# Patient Record
Sex: MALE | Race: BLACK OR AFRICAN AMERICAN | NOT HISPANIC OR LATINO | Employment: UNEMPLOYED | URBAN - METROPOLITAN AREA
[De-identification: names, ages, dates, MRNs, and addresses within clinical notes are randomized per-mention and may not be internally consistent; named-entity substitution may affect disease eponyms.]

---

## 2017-05-03 ENCOUNTER — APPOINTMENT (OUTPATIENT)
Dept: GENERAL RADIOLOGY | Facility: CLINIC | Age: 27
End: 2017-05-03
Attending: EMERGENCY MEDICINE
Payer: MEDICAID

## 2017-05-03 ENCOUNTER — HOSPITAL ENCOUNTER (EMERGENCY)
Facility: CLINIC | Age: 27
Discharge: HOME | End: 2017-05-03
Attending: EMERGENCY MEDICINE | Admitting: EMERGENCY MEDICINE
Payer: MEDICAID

## 2017-05-03 VITALS
SYSTOLIC BLOOD PRESSURE: 116 MMHG | RESPIRATION RATE: 18 BRPM | TEMPERATURE: 98.7 F | BODY MASS INDEX: 17.89 KG/M2 | WEIGHT: 135 LBS | HEIGHT: 73 IN | OXYGEN SATURATION: 100 % | DIASTOLIC BLOOD PRESSURE: 65 MMHG | HEART RATE: 97 BPM

## 2017-05-03 DIAGNOSIS — F10.920 ALCOHOL INTOXICATION, UNCOMPLICATED (H): ICD-10-CM

## 2017-05-03 DIAGNOSIS — R07.89 CHEST WALL PAIN: ICD-10-CM

## 2017-05-03 LAB
ALCOHOL BREATH TEST: 0.13 (ref 0–0.01)
INTERPRETATION ECG - MUSE: NORMAL

## 2017-05-03 PROCEDURE — 93010 ELECTROCARDIOGRAM REPORT: CPT | Mod: Z6 | Performed by: EMERGENCY MEDICINE

## 2017-05-03 PROCEDURE — 82075 ASSAY OF BREATH ETHANOL: CPT

## 2017-05-03 PROCEDURE — 71020 XR CHEST 2 VW: CPT

## 2017-05-03 PROCEDURE — 93005 ELECTROCARDIOGRAM TRACING: CPT

## 2017-05-03 PROCEDURE — 99284 EMERGENCY DEPT VISIT MOD MDM: CPT | Mod: 25 | Performed by: EMERGENCY MEDICINE

## 2017-05-03 PROCEDURE — 99284 EMERGENCY DEPT VISIT MOD MDM: CPT | Mod: 25

## 2017-05-03 ASSESSMENT — ENCOUNTER SYMPTOMS
VOMITING: 0
FEVER: 0
COUGH: 0
LIGHT-HEADEDNESS: 0
CONFUSION: 0
RHINORRHEA: 0
EYE PAIN: 0
PALPITATIONS: 0
BRUISES/BLEEDS EASILY: 0
NECK PAIN: 0
CHILLS: 0
WEAKNESS: 0
SORE THROAT: 0
BACK PAIN: 0
SHORTNESS OF BREATH: 0
DIZZINESS: 0
APPETITE CHANGE: 0
NAUSEA: 0
SINUS PRESSURE: 0
MYALGIAS: 0
FATIGUE: 1
ABDOMINAL PAIN: 0
ARTHRALGIAS: 0
HEADACHES: 0
FLANK PAIN: 0
CHEST TIGHTNESS: 0
DIAPHORESIS: 0
HEMATURIA: 0

## 2017-05-03 NOTE — ED AVS SNAPSHOT
[facility], Emergency Department    [address]    [place] [address]    Phone:  [phone]    Fax:  [phone]                                       Candido Sahni   MRN: [id number]    Department:  [facility], Emergency Department   Date of Visit:  5/3/2017           Patient Information     Date Of Birth          1990        Your diagnoses for this visit were:     Chest wall pain     Alcohol intoxication, uncomplicated (H)        You were seen by Tereso Larson MD.        Discharge Instructions       Take ibuprofen or Tylenol as needed for pain.  Clinic follow up with your primary care provider and with your psychiatrist.  Return if persistent symptoms.   Avoid excessive alcohol use.    24 Hour Appointment Hotline       To make an appointment at any [facility] clinic, call [phone] ([phone]). If you don't have a family doctor or clinic, we will help you find one. [facility] clinics are conveniently located to serve the needs of you and your family.             Review of your medicines      Notice     You have not been prescribed any medications.            Procedures and tests performed during your visit     Alcohol breath test POCT    Chest XR,  PA & LAT    EKG 12-lead      Orders Needing Specimen Collection     None      Pending Results     Date and Time Order Name Status Description    5/3/2017 0157 EKG 12-lead Preliminary             Pending Culture Results     No orders found from 5/1/2017 to 5/4/2017.            Pending Results Instructions     If you had any lab results that were not finalized at the time of your Discharge, you can call the ED Lab Result RN at [phone]. You will be contacted by this team for any positive Lab results or changes in treatment. The nurses are available 7 days a week from 10A to 6:30P.  You can leave a message 24 hours per day and they will return your call.        Thank you for choosing [facility]       Thank you for choosing [facility] for  "your care. Our goal is always to provide you with excellent care. Hearing back from our patients is one way we can continue to improve our services. Please take a few minutes to complete the written survey that you may receive in the mail after you visit with us. Thank you!        [employer] Information     [employer] lets you send messages to your doctor, view your test results, renew your prescriptions, schedule appointments and more. To sign up, go to [email or web address] . Click on \"Log in\" on the left side of the screen, which will take you to the Welcome page. Then click on \"Sign up Now\" on the right side of the page.     You will be asked to enter the access code listed below, as well as some personal information. Please follow the directions to create your username and password.     Your access code is: [email or web address]  Expires: 2017  5:55 AM     Your access code will  in 90 days. If you need help or a new code, please call your [facility] clinic or [phone].        Care EveryWhere ID     This is your Care EveryWhere ID. This could be used by other organizations to access your [facility] medical records  [id number]        After Visit Summary       This is your record. Keep this with you and show to your community pharmacist(s) and doctor(s) at your next visit.                  "

## 2017-05-03 NOTE — DISCHARGE INSTRUCTIONS
Take ibuprofen or Tylenol as needed for pain.  Clinic follow up with your primary care provider and with your psychiatrist.  Return if persistent symptoms.   Avoid excessive alcohol use.

## 2017-05-03 NOTE — ED PROVIDER NOTES
"  History     Chief Complaint   Patient presents with     Chest Wall Pain     feels like straw blowing in my chest, patient admits drinking etoh     HPI  Candido Sahni is a 26 year old male with history of psychosis and homelessness who presents with 2 day history of chest pain. Located bilateral anterior chest. No trauma. No cough or fever. No dyspnea. No history of thromboembolic disease or cardiovascular disease. No history of diabetes, dyslipidemia, or hypertension. Reports alcohol use. Denies cocaine or other drug use. No leg pain or swelling.    I have reviewed the Medications, Allergies, Past Medical and Surgical History, and Social History in the [facility] system.  Past Medical History:   Diagnosis Date     Psychosis 4/6/2012       Review of Systems   Constitutional: Positive for fatigue. Negative for appetite change, chills, diaphoresis and fever.   HENT: Negative for congestion, rhinorrhea, sinus pressure and sore throat.    Eyes: Negative for pain and visual disturbance.   Respiratory: Negative for cough, chest tightness and shortness of breath.    Cardiovascular: Positive for chest pain. Negative for palpitations and leg swelling.   Gastrointestinal: Negative for abdominal pain, nausea and vomiting.   Genitourinary: Negative for flank pain and hematuria.   Musculoskeletal: Negative for arthralgias, back pain, myalgias and neck pain.   Skin: Negative for rash.   Allergic/Immunologic: Negative for immunocompromised state.   Neurological: Negative for dizziness, syncope, weakness, light-headedness and headaches.   Hematological: Does not bruise/bleed easily.   Psychiatric/Behavioral: Negative for confusion and suicidal ideas.       Physical Exam   BP: 118/84  Pulse: 103  Temp: 97.8  F (36.6  C)  Resp: 16  Height: 185.4 cm (6' 1\")  Weight: 61.2 kg (135 lb)  SpO2: 99 %  Physical Exam   Constitutional:   Thin male, awake and alert, no acute distress.    HENT:   Head: Normocephalic and atraumatic.   Mouth/Throat: " Oropharynx is clear and moist.   Eyes: Conjunctivae and EOM are normal.   Neck: Normal range of motion. Neck supple.   Cardiovascular: Normal rate, regular rhythm, normal heart sounds and intact distal pulses.  Exam reveals no gallop and no friction rub.    No murmur heard.  Pulmonary/Chest: Effort normal and breath sounds normal. No respiratory distress. He exhibits tenderness.   Abdominal: Soft. There is no tenderness.   Musculoskeletal: He exhibits no edema or tenderness.   Skin: Skin is warm and dry. No rash noted.   Psychiatric: His affect is blunt. He is withdrawn. He expresses no homicidal and no suicidal ideation. He is noncommunicative.   Nursing note and vitals reviewed.      ED Course     ED Course     Procedures             EKG Interpretation:      Interpreted by Tereso Mayfield  Time reviewed: 0205  Symptoms at time of EKG: chest pain   Rhythm: normal sinus   Rate: normal  Axis: normal  Ectopy: none  Conduction: normal  ST Segments/ T Waves: No ST-T wave changes  Q Waves: none  Comparison to prior: No old EKG available    Clinical Impression: normal EKG          Critical Care time:  none               Labs Ordered and Resulted from Time of ED Arrival Up to the Time of Departure from the ED   ALCOHOL BREATH TEST POCT - Abnormal; Notable for the following:        Result Value    Alcohol Breath Test 0.126 (*)     All other components within normal limits            Assessments & Plan (with Medical Decision Making)   Rest, cold compresses and acetaminophen or ibuprofen for pain. Given list of homeless shelters. Given phone number for chemical dependency services. Instructed to follow up with primary care and psychiatry. At time of discharge he is awake and alert, ambulatory without difficulty, and is tolerating po well.   I have reviewed the nursing notes.    I have reviewed the findings, diagnosis, plan and need for follow up with the patient.    New Prescriptions    No medications on file       Final  diagnoses:   Chest wall pain   Alcohol intoxication, uncomplicated (H)       5/3/2017   [facility], [place], EMERGENCY DEPARTMENT     Tereso Larson MD  05/03/17 7807

## 2017-05-03 NOTE — ED NOTES
Patient is awake and alert. Denies SI and HI. Pleasant and cooperative. Alcohol breath was 0.126.  stated patient does not need to be on a watch.

## 2017-05-03 NOTE — ED AVS SNAPSHOT
[facility], [place], Emergency Department    [address]    [facility] [address]    Phone:  [phone]    Fax:  [phone]                                       Candido Sahni   MRN: [id number]    Department:  [facility], Emergency Department   Date of Visit:  5/3/2017           After Visit Summary Signature Page     I have received my discharge instructions, and my questions have been answered. I have discussed any challenges I see with this plan with the nurse or doctor.    ..........................................................................................................................................  Patient/Patient Representative Signature      ..........................................................................................................................................  Patient Representative Print Name and Relationship to Patient    ..................................................               ................................................  Date                                            Time    ..........................................................................................................................................  Reviewed by Signature/Title    ...................................................              ..............................................  Date                                                            Time

## 2019-10-05 ENCOUNTER — HOSPITAL ENCOUNTER (EMERGENCY)
Facility: CLINIC | Age: 29
Discharge: HOME | End: 2019-10-06
Attending: EMERGENCY MEDICINE | Admitting: EMERGENCY MEDICINE

## 2019-10-05 DIAGNOSIS — F10.920 ALCOHOLIC INTOXICATION WITHOUT COMPLICATION (H): ICD-10-CM

## 2019-10-05 PROCEDURE — 99282 EMERGENCY DEPT VISIT SF MDM: CPT

## 2019-10-05 NOTE — ED AVS SNAPSHOT
Emergency Department  [address] [place] [address]  Phone:  [phone]  Fax:  [phone]                                    Rika Sahni   MRN: [id number]    Department:   Emergency Department   Date of Visit:  10/5/2019           After Visit Summary Signature Page    I have received my discharge instructions, and my questions have been answered. I have discussed any challenges I see with this plan with the nurse or doctor.    ..........................................................................................................................................  Patient/Patient Representative Signature      ..........................................................................................................................................  Patient Representative Print Name and Relationship to Patient    ..................................................               ................................................  Date                                   Time    ..........................................................................................................................................  Reviewed by Signature/Title    ...................................................              ..............................................  Date                                               Time          22EPIC Rev 08/18

## 2019-10-06 VITALS
RESPIRATION RATE: 16 BRPM | DIASTOLIC BLOOD PRESSURE: 82 MMHG | TEMPERATURE: 97.5 F | SYSTOLIC BLOOD PRESSURE: 131 MMHG | OXYGEN SATURATION: 99 %

## 2019-10-06 NOTE — ED PROVIDER NOTES
History     Chief Complaint:  Alcohol Intoxication    History limited due to alcohol intoxication. History obtained via EMS.   HPI   Rika aShni is a 29 year old male who presents with alcohol intoxication. Per EMS[place] was visibly intoxicated and unable to care for himself so he was brought into the ED.        Allergies:  No Known Drug Allergies     Medications:    Medications reviewed. No pertinent medications.     Past Medical History:    Past medical history reviewed. No pertinent medical history.     Past Surgical History:    Surgical history reviewed. No pertinent surgical history.     Family History:    Family history reviewed. No pertinent family history.     Social History:  Alcohol Use: Positive    Review of Systems    ROS limited due to alcohol intoxication.   Physical Exam     Patient Vitals for the past 24 hrs:   BP Temp Temp src Heart Rate Resp SpO2   10/06/19 0225 -- -- -- 85 16 99 %   10/06/19 0038 -- -- -- 91 18 100 %   10/05/19 2331 131/82 97.5  F (36.4  C) Temporal 75 -- 100 %        Physical Exam  General: Appears intoxicated  Head: No signs of trauma.   Mouth/Throat: Oropharynx is clear and moist.   CV: Normal rate and regular rhythm.    Resp: Effort normal and breath sounds normal. No respiratory distress.   GI: Soft. There is no tenderness.  No rebound or guarding.  Normal bowel sounds.    MSK: Normal range of motion. no edema. No Calf tenderness.  Neuro: The patient is alert but unable to provide significant history.  Speech slurred  Skin: Skin is warm and dry. No rash noted.         Emergency Department Course     Emergency Department Course:    2323 Nursing notes and vitals reviewed.    2327 I performed an exam of the patient as documented above.     0540 Patient rechecked and updated.      0600 Patient was signed out to .    Impression & Plan      Medical Decision Making:  Rika Sahni is a 29 year old  male who presents to the emergency department today for evaluation of alcohol intoxication. [facility] involved. On my initial evaluation, he did appear quite intoxicated but was protecting his airway.  Over time, his sensorium did clear and he was able to endorse alcohol use.  Denied any other injuries or complaints.  Patient was signed out to  with the plan for discharge when he is stable on his feet as he is planning on taking the train home.        Diagnosis:    ICD-10-CM    1. Alcoholic intoxication without complication (H) F10.920      Disposition:   Patient was signed out to     Discharge Medications:  No discharge medications.      Scribe Disclosure:  I, Marshall Rosario, am serving as a scribe at 11:24 PM on 10/5/2019 to document services personally performed by Geovani Bettencourt MD based on my observations and the provider's statements to me.       EMERGENCY DEPARTMENT       Geovani Bettencourt MD  10/06/19 0606

## 2020-06-02 ENCOUNTER — APPOINTMENT (OUTPATIENT)
Dept: GENERAL RADIOLOGY | Facility: CLINIC | Age: 30
End: 2020-06-02
Attending: NURSE PRACTITIONER
Payer: COMMERCIAL

## 2020-06-02 ENCOUNTER — HOSPITAL ENCOUNTER (EMERGENCY)
Facility: CLINIC | Age: 30
Discharge: HOME | End: 2020-06-03
Attending: PHYSICIAN ASSISTANT | Admitting: PHYSICIAN ASSISTANT
Payer: COMMERCIAL

## 2020-06-02 VITALS
OXYGEN SATURATION: 98 % | RESPIRATION RATE: 16 BRPM | SYSTOLIC BLOOD PRESSURE: 126 MMHG | TEMPERATURE: 99 F | DIASTOLIC BLOOD PRESSURE: 99 MMHG | HEART RATE: 77 BPM

## 2020-06-02 DIAGNOSIS — F10.929 ALCOHOL INTOXICATION (H): ICD-10-CM

## 2020-06-02 DIAGNOSIS — S60.00XA CONTUSION OF FINGER OF RIGHT HAND, INITIAL ENCOUNTER: ICD-10-CM

## 2020-06-02 LAB
ALBUMIN SERPL-MCNC: 3.7 G/DL (ref 3.4–5)
ALCOHOL BREATH TEST: 0.15 (ref 0–0.01)
ALP SERPL-CCNC: 93 U/L (ref 40–150)
ALT SERPL W P-5'-P-CCNC: 100 U/L (ref 0–70)
ANION GAP SERPL CALCULATED.3IONS-SCNC: 7 MMOL/L (ref 3–14)
AST SERPL W P-5'-P-CCNC: 82 U/L (ref 0–45)
BASOPHILS # BLD AUTO: 0 10E9/L (ref 0–0.2)
BASOPHILS NFR BLD AUTO: 0.8 %
BILIRUB SERPL-MCNC: 0.5 MG/DL (ref 0.2–1.3)
BUN SERPL-MCNC: 10 MG/DL (ref 7–30)
CALCIUM SERPL-MCNC: 8.2 MG/DL (ref 8.5–10.1)
CHLORIDE SERPL-SCNC: 110 MMOL/L (ref 94–109)
CO2 SERPL-SCNC: 26 MMOL/L (ref 20–32)
CREAT SERPL-MCNC: 0.58 MG/DL (ref 0.66–1.25)
DIFFERENTIAL METHOD BLD: ABNORMAL
EOSINOPHIL # BLD AUTO: 0 10E9/L (ref 0–0.7)
EOSINOPHIL NFR BLD AUTO: 0.6 %
ERYTHROCYTE [DISTWIDTH] IN BLOOD BY AUTOMATED COUNT: 14.2 % (ref 10–15)
ETHANOL SERPL-MCNC: 0.35 G/DL
GFR SERPL CREATININE-BSD FRML MDRD: >90 ML/MIN/{1.73_M2}
GLUCOSE SERPL-MCNC: 88 MG/DL (ref 70–99)
HCT VFR BLD AUTO: 39.8 % (ref 40–53)
HGB BLD-MCNC: 13.4 G/DL (ref 13.3–17.7)
IMM GRANULOCYTES # BLD: 0 10E9/L (ref 0–0.4)
IMM GRANULOCYTES NFR BLD: 0.2 %
LYMPHOCYTES # BLD AUTO: 2.9 10E9/L (ref 0.8–5.3)
LYMPHOCYTES NFR BLD AUTO: 58 %
MCH RBC QN AUTO: 29.5 PG (ref 26.5–33)
MCHC RBC AUTO-ENTMCNC: 33.7 G/DL (ref 31.5–36.5)
MCV RBC AUTO: 88 FL (ref 78–100)
MONOCYTES # BLD AUTO: 0.3 10E9/L (ref 0–1.3)
MONOCYTES NFR BLD AUTO: 5.9 %
NEUTROPHILS # BLD AUTO: 1.8 10E9/L (ref 1.6–8.3)
NEUTROPHILS NFR BLD AUTO: 34.5 %
NRBC # BLD AUTO: 0 10*3/UL
NRBC BLD AUTO-RTO: 0 /100
PLATELET # BLD AUTO: 249 10E9/L (ref 150–450)
POTASSIUM SERPL-SCNC: 3.5 MMOL/L (ref 3.4–5.3)
PROT SERPL-MCNC: 7.3 G/DL (ref 6.8–8.8)
RBC # BLD AUTO: 4.55 10E12/L (ref 4.4–5.9)
SODIUM SERPL-SCNC: 143 MMOL/L (ref 133–144)
WBC # BLD AUTO: 5.1 10E9/L (ref 4–11)

## 2020-06-02 PROCEDURE — 25000128 H RX IP 250 OP 636: Performed by: PHYSICIAN ASSISTANT

## 2020-06-02 PROCEDURE — 82075 ASSAY OF BREATH ETHANOL: CPT

## 2020-06-02 PROCEDURE — 80053 COMPREHEN METABOLIC PANEL: CPT | Performed by: PHYSICIAN ASSISTANT

## 2020-06-02 PROCEDURE — 25800030 ZZH RX IP 258 OP 636: Performed by: PHYSICIAN ASSISTANT

## 2020-06-02 PROCEDURE — 99285 EMERGENCY DEPT VISIT HI MDM: CPT | Mod: 25

## 2020-06-02 PROCEDURE — 85025 COMPLETE CBC W/AUTO DIFF WBC: CPT | Performed by: PHYSICIAN ASSISTANT

## 2020-06-02 PROCEDURE — 25000125 ZZHC RX 250

## 2020-06-02 PROCEDURE — 80320 DRUG SCREEN QUANTALCOHOLS: CPT | Performed by: PHYSICIAN ASSISTANT

## 2020-06-02 PROCEDURE — 73130 X-RAY EXAM OF HAND: CPT | Mod: RT

## 2020-06-02 RX ORDER — WATER 10 ML/10ML
INJECTION INTRAMUSCULAR; INTRAVENOUS; SUBCUTANEOUS
Status: COMPLETED
Start: 2020-06-02 | End: 2020-06-02

## 2020-06-02 RX ORDER — OLANZAPINE 10 MG/2ML
5 INJECTION, POWDER, FOR SOLUTION INTRAMUSCULAR ONCE
Status: COMPLETED | OUTPATIENT
Start: 2020-06-02 | End: 2020-06-02

## 2020-06-02 RX ADMIN — SODIUM CHLORIDE 1000 ML: 9 INJECTION, SOLUTION INTRAVENOUS at 14:34

## 2020-06-02 RX ADMIN — WATER 10 ML: 1 INJECTION INTRAMUSCULAR; INTRAVENOUS; SUBCUTANEOUS at 15:44

## 2020-06-02 RX ADMIN — OLANZAPINE 5 MG: 10 INJECTION, POWDER, FOR SOLUTION INTRAMUSCULAR at 15:44

## 2020-06-02 NOTE — ED NOTES
Emergency Department Note  6/2/2020  5:42 PM    The patient was signed out to me from Rani Jamil PA-C @[phone]    Briefly, Candido Sahni is a 30 year old male with medical history including alcohol intoxication and psychosis who presents to the emergency department today for evaluation of alcohol intoxication.  EMS note the patient was found on the streets, intoxicated, belligerent, and combative. He arrived in physical restraints.  Patient is acutely intoxicated and unable to obtain a history.  He is protecting his airway.  LFTs elevated, consistent with alcohol abuse.  Initial alcohol blood 0.35.  Patient was given Zyprexa and physical restraints able to be removed.  He was given 1 L of normal saline.     On my exam,   Nursing notes reviewed. Vitals reviewed.  General: Sleeping. arouses with verbal stimulation  Eyes:  Conjunctiva non-injected, non-icteric.  Neck/Throat: Moist mucous membranes. Normal voice.  Cardiac: Regular rhythm. Normal heart sounds.  Pulmonary: Clear and equal breath sounds bilaterally.   Musculoskeletal: Normal gross range of motion of all 4 extremities. Right 5th MCP swollen and painful  Neurological: Alert and oriented x person, situation   Skin: Warm and dry. Normal appearance of visualized exposed skin without rashes or petechiae.  Psych: Affect normal. Good eye contact.  --Physical appearance: Appears stated age. Eye contact at examiner.   --Speech regular, speech volume regular, speech quality fluid.   --Cognitive: answers questions with intermittent appropriate answers.  --Hallucinations: Not observed.   --General behavioral tone: Cooperative.  --Psychomotor activity: No problem noted. Gait: not observed  --Mood normal. Affect congruent and appropriate.    Interventions:  1434: Normal Saline, 1 liter, IV bolus   1544: Zyprexa, 5 mg, IM  1544: Sterile water, 10 mL, injection    Vital signs:  Patient Vitals for the past 24 hrs:   BP Temp Temp src Pulse Heart Rate Resp SpO2   06/02/20 1930  (!) 126/99 -- -- 77 -- 16 98 %   06/02/20 1753 -- -- -- -- 70 12 --   06/02/20 1751 126/82 -- -- 69 -- -- --   06/02/20 1700 (!) 138/106 -- -- 84 85 22 --   06/02/20 1630 -- -- -- -- 80 18 --   06/02/20 1600 -- -- -- -- 86 11 --   06/02/20 1302 (!) 134/104 99  F (37.2  C) Temporal 119 -- 20 100 %     Laboratory:  Laboratory findings were communicated with the patient who voiced understanding of the findings.     CBC: WBC 5.1, HGB 13.4,   CMP: Chloride 110(H), Creatinine 0.58(L), (H), AST 82(H) o/w WNL  1554: Alcohol ethyl: 0.35(HH)    2218: Alcohol Ethyl: 0.146(H)    Imaging:  XR Hand Right:   Ulnar sided soft tissue swelling of the right hand. No   other abnormality is seen.   Reading per radiology     Head CT:pending    ED course:    1748 I performed and exam of the patient and evaluation. Patient is still sleeping.     2125 I rechecked the patient. He is able to wake easily and speaks in clear sentences but falls asleep immediately between cares.     2300 I rechecked the patient.   He is able to wake easily and speaks in clear sentences but falls asleep immediately between cares.     My impression is continued intoxication.  The patient is able to be woke and was able to eat a meal but continues to be clinically and by breathalyzer intoxicated and will need to become sober and ambulatory prior to discharge.   Reevaluation reveals right hand swelling and xray negative for fracture.   I will add head CT secondary to his slow waking and this result will be reviewed by Dr. Arias.  The patient did initially require Zyprexa but since then he has been cooperative and has not required additional sedation.  He is protecting his airway. I will sign him out to my partner Dr. Armstrong with plan to discharge when sober.    Diagnosis    ICD-10-CM    1. Contusion of finger of right hand, initial encounter  S60.00XA    2. Alcohol intoxication (H)  F10.929       Scribe Disclosure:  I, Lois Narvaez, am serving as a  scribe at 3729 on 6/2/2020 to document services personally performed by Laly Carlisle DNP based on my observations and the provider's statements to me.     Laly Carlisle, CNP  06/02/20 2497

## 2020-06-02 NOTE — ED NOTES
Patient arrived highly intoxicated, he is belligerent and attempting to hit , he came in the ambulance with restraints.

## 2020-06-02 NOTE — ED NOTES
Bed: [facility]  Expected date:   Expected time:   Means of arrival:   Comments:  nishi - 514 - 30 M ETOH on hold restraints eta 1235

## 2020-06-02 NOTE — ED NOTES
Restraints started around 13:15 , patient is drank and slightly belligerent, yelling and pushing, Md informed the face to face assessment was done by Dr Rani Jamil.  I was not able to enter all this information on the flow sheets for the restrains because it disappeared from the Se[facility] restraints.

## 2020-06-02 NOTE — ED PROVIDER NOTES
History     Chief Complaint:  Alcohol Intoxication    The history is provided by the EMS personnel. History limited by: Alcohol intoxication, agitation.      Candido Sahni is a 30 year old male who presents via EMS with alcohol intoxication. Per EMS personnel, the patient was [place] and was breathalyzed at a 0.36. He is agitated and belligerent and not able to care for himself. He presents with physical restraints.     Allergies:  No known drug allergies.    Medications:    The patient is not currently taking any prescribed medications.    Past Medical History:    Pyschosis    Past Surgical History:    ORIF, mandible x2  Mandibular maxilla removal of hardware    Family History:    No past pertinent family history.    Social History:  Smoking Status: Current Every Day Smoker  Smokeless Tobacco: Never Used  Alcohol Use: Positive  Drug Use: Negative  Marital Status:  Single      Review of Systems   Unable to perform ROS: Acuity of condition     Physical Exam     Patient Vitals for the past 24 hrs:   BP Temp Temp src Pulse Resp SpO2   06/02/20 1302 (!) 134/104 99  F (37.2  C) Temporal 119 20 100 %       Physical Exam  General: Alert, interactive. Intoxicated. Smells of alcohol.  Slurred speech.  Head:  Scalp is atraumatic.  Eyes:  EOM intact. The pupils are equal, round, and reactive to light. No scleral icterus.   ENT:                                      Ears:  The external ears are normal.  Nose:  The external nose is normal.  Throat:  The oropharynx is normal.  Protecting his airway.  Mucus membranes are dry.                 Neck:  Normal range of motion. There is no rigidity.   CV:  tachycardic rate and regular rhythm. No murmur. 2+ radial pulses  Resp:  Breath sounds are clear bilaterally. Non-labored, no retractions or accessory muscle use.  GI:  Abdomen is soft, no distension, no tenderness.   MS:  Normal range of motion throughout.   Skin:  Warm and dry.   Neuro:  Strength  and sensation grossly intact.   Psych:  Awake. Alert.  Intoxicated.  Slurred speech.    Emergency Department Course     Laboratory:  Laboratory findings were communicated with the patient who voiced understanding of the findings.    CBC: WBC 5.1, HGB 13.4,   CMP: Chloride 110(H), Creatinine 0.58(L), (H), AST 82(H) o/w WNL  Alcohol ethyl: 0.35(HH)    Interventions:  1434: Normal Saline, 1 liter, IV bolus   1544: Zyprexa, 5 mg, IM  1544: Sterile water, 10 mL, injection    Emergency Department Course:    1314 Nursing notes and vitals reviewed.    1336 I performed an exam of the patient as documented above.     1407 IV was inserted and blood was drawn for laboratory testing, results above.    1700 Patient was signed out to Laly Carlisle DNP.    Impression & Plan      Medical Decision Making:  Candido Sahni is a 30 year old male with medical history including alcohol intoxication and psychosis who presents to the emergency department today for evaluation of alcohol intoxication.  EMS note the patient was found on the streets, intoxicated, belligerent, and combative. He arrived in physical restraints.  Patient is acutely intoxicated and unable to obtain a history.  He is protecting his airway.  LFTs elevated, consistent with alcohol abuse.  Initial alcohol blood 0.35.  Patient was given Zyprexa and physical restraints able to be removed.  He was given 1 L of normal saline.  Upon the end of my shift, he is still acutely intoxicated.  Plan for signout to my colleague, JAZMYNE Mckenna.  Plan to reassess and discharge once clinically sober.  At the end of my shift, nurse noted swelling to the right hand.  This was relayed to Laly and plan for x-ray.    Disposition:   Patient was signed out to Dr. Laly Carlisle DNP.    Scribe Disclosure:  I, Felipe Dior, am serving as a scribe at 1:20 PM on 6/2/2020 to document services personally performed by Rani Jamil PA-C based on my observations and the  provider's statements to me.     EMERGENCY DEPARTMENT       Rani Jamil PA-C  06/02/20 4711

## 2020-06-02 NOTE — ED AVS SNAPSHOT
Emergency Department  [address] [place] [address]  Phone:  [phone]  Fax:  [phone]                                    Candido Sahni   MRN: [id number]    Department:   Emergency Department   Date of Visit:  6/2/2020           After Visit Summary Signature Page    I have received my discharge instructions, and my questions have been answered. I have discussed any challenges I see with this plan with the nurse or doctor.    ..........................................................................................................................................  Patient/Patient Representative Signature      ..........................................................................................................................................  Patient Representative Print Name and Relationship to Patient    ..................................................               ................................................  Date                                   Time    ..........................................................................................................................................  Reviewed by Signature/Title    ...................................................              ..............................................  Date                                               Time          22EPIC Rev 08/18

## 2020-06-03 ENCOUNTER — APPOINTMENT (OUTPATIENT)
Dept: CT IMAGING | Facility: CLINIC | Age: 30
End: 2020-06-03
Attending: NURSE PRACTITIONER
Payer: COMMERCIAL

## 2020-06-03 PROCEDURE — 70450 CT HEAD/BRAIN W/O DYE: CPT

## 2020-06-03 NOTE — ED NOTES
"Assumed care of pt at this time.    Report: Pt here with alcohol intoxication. Speech is slurred. Pt is compliant with care. Denies any needs at this time. Inquired if patient is able to get a sober ride home, pt states \"no\".    To be completed: none. Continue with plan of care.    Current Vitals: BP (!) 126/99   Pulse 77   Temp 99  F (37.2  C) (Temporal)   Resp 16   SpO2 98%     Tele DEC: n/a    Belonging checklist: Belongings secured in locker.    Disposition: Not determined at this time.  Jordyn Ochoa RN,.......................................... 6/2/2020   7:46 PM    "

## 2020-06-03 NOTE — ED PROVIDER NOTES
Received patient in signout from Laly Mckenna nurse practitioner awaiting CT head results and clinical sobriety.  Head CT was performed and reveals no acute intracranial process.  Patient has sobered appropriately and is talking clearly and having a stable gait.  He is tolerated p.o.  He is requesting discharge by taxicab which will be arranged for him.  Return precautions the emergency department are reviewed.  At this time patient is clinically sober for discharge.     Cher Armstrong MD  06/03/20 0456

## 2020-06-03 NOTE — ED NOTES
"Pt stating his head is hurting him and asked about \"my head pictures\". Writer informed him head CT came back normal. Pt repeatedly stating his head hurts, and that \"it has been hurting for 2 or 3 years\". Writer told pt she would pass that information to the care team. Pt then stated \"my eye hurts.. see look\". Writer noted that eyes were bloodshot. Pt stated \"this has been going on for years, before I ever tasted alcohol\". Pt also complaining of ear pain. RN notified.   "

## 2020-06-03 NOTE — ED NOTES
Provider notified on conflicting adressess that pt is giving staff. Plan for sobriety and dispo in the am.    Report off to vijay MISHRA.  Jordyn Ochoa RN,.......................................... 6/2/2020   11:08 PM

## 2020-06-03 NOTE — ED NOTES
"Pt able to walk with steady gait. Pt requesting orange juice and water. Writer provided both and a courtesy meal. Pt asking when he can leave. Writer asked pt if he could find a ride home, but he said no. Pt asked which hospital he was in, then stated he \"could walk home\". Pt states he does not have money for a cab. Writer reminded pt of current curfew so he may not be able to walk home at this time. RN notified.  "

## 2021-01-23 ENCOUNTER — HOSPITAL ENCOUNTER (INPATIENT)
Facility: CLINIC | Age: 31
LOS: 3 days | Discharge: HOME | End: 2021-01-26
Attending: EMERGENCY MEDICINE | Admitting: PSYCHIATRY & NEUROLOGY
Payer: COMMERCIAL

## 2021-01-23 ENCOUNTER — TELEPHONE (OUTPATIENT)
Dept: BEHAVIORAL HEALTH | Facility: CLINIC | Age: 31
End: 2021-01-23

## 2021-01-23 VITALS
RESPIRATION RATE: 16 BRPM | OXYGEN SATURATION: 99 % | SYSTOLIC BLOOD PRESSURE: 138 MMHG | TEMPERATURE: 98 F | HEIGHT: 73 IN | DIASTOLIC BLOOD PRESSURE: 82 MMHG | BODY MASS INDEX: 17.49 KG/M2 | HEART RATE: 91 BPM | WEIGHT: 132 LBS

## 2021-01-23 DIAGNOSIS — F60.89 OTHER SPECIFIC PERSONALITY DISORDERS (H): ICD-10-CM

## 2021-01-23 DIAGNOSIS — F29 PSYCHOSIS, UNSPECIFIED PSYCHOSIS TYPE (H): ICD-10-CM

## 2021-01-23 DIAGNOSIS — F28 OTHER PSYCHOTIC DISORDER NOT DUE TO SUBSTANCE OR KNOWN PHYSIOLOGICAL CONDITION (H): ICD-10-CM

## 2021-01-23 DIAGNOSIS — Z11.52 ENCOUNTER FOR SCREENING LABORATORY TESTING FOR COVID-19 VIRUS: ICD-10-CM

## 2021-01-23 DIAGNOSIS — R05.9 COUGH: ICD-10-CM

## 2021-01-23 DIAGNOSIS — R09.81 NASAL CONGESTION: Primary | ICD-10-CM

## 2021-01-23 DIAGNOSIS — F22 PARANOIA (H): ICD-10-CM

## 2021-01-23 DIAGNOSIS — F15.159 OTH STIMULANT ABUSE W STIM-INDUCE PSYCHOTIC DISORDER, UNSP (H): ICD-10-CM

## 2021-01-23 DIAGNOSIS — H65.00 NON-RECURRENT ACUTE SEROUS OTITIS MEDIA, UNSPECIFIED LATERALITY: ICD-10-CM

## 2021-01-23 DIAGNOSIS — F19.10 POLYSUBSTANCE ABUSE (H): ICD-10-CM

## 2021-01-23 LAB
AMPHETAMINES UR QL SCN: POSITIVE
BARBITURATES UR QL: NEGATIVE
BENZODIAZ UR QL: NEGATIVE
CANNABINOIDS UR QL SCN: POSITIVE
COCAINE UR QL: NEGATIVE
ETHANOL UR QL SCN: NEGATIVE
LABORATORY COMMENT REPORT: NORMAL
OPIATES UR QL SCN: NEGATIVE
SARS-COV-2 RNA RESP QL NAA+PROBE: NEGATIVE
SPECIMEN SOURCE: NORMAL

## 2021-01-23 PROCEDURE — 99285 EMERGENCY DEPT VISIT HI MDM: CPT | Mod: 25 | Performed by: EMERGENCY MEDICINE

## 2021-01-23 PROCEDURE — 90791 PSYCH DIAGNOSTIC EVALUATION: CPT

## 2021-01-23 PROCEDURE — C9803 HOPD COVID-19 SPEC COLLECT: HCPCS | Performed by: EMERGENCY MEDICINE

## 2021-01-23 PROCEDURE — 124N000002 HC R&B MH UMMC

## 2021-01-23 PROCEDURE — 80320 DRUG SCREEN QUANTALCOHOLS: CPT | Performed by: EMERGENCY MEDICINE

## 2021-01-23 PROCEDURE — 99285 EMERGENCY DEPT VISIT HI MDM: CPT | Performed by: EMERGENCY MEDICINE

## 2021-01-23 PROCEDURE — 87635 SARS-COV-2 COVID-19 AMP PRB: CPT | Performed by: EMERGENCY MEDICINE

## 2021-01-23 PROCEDURE — 250N000013 HC RX MED GY IP 250 OP 250 PS 637: Performed by: EMERGENCY MEDICINE

## 2021-01-23 PROCEDURE — 80307 DRUG TEST PRSMV CHEM ANLYZR: CPT | Performed by: EMERGENCY MEDICINE

## 2021-01-23 RX ORDER — QUETIAPINE FUMARATE 25 MG/1
50 TABLET, FILM COATED ORAL ONCE
Status: DISCONTINUED | OUTPATIENT
Start: 2021-01-23 | End: 2021-01-23

## 2021-01-23 RX ORDER — DOXYCYCLINE 100 MG/1
100 CAPSULE ORAL
Status: ON HOLD | COMMUNITY
Start: 2021-01-22 | End: 2021-01-25

## 2021-01-23 RX ORDER — DOXYCYCLINE 100 MG/1
100 CAPSULE ORAL ONCE
Status: COMPLETED | OUTPATIENT
Start: 2021-01-23 | End: 2021-01-23

## 2021-01-23 RX ORDER — ACETAMINOPHEN 325 MG/1
650 TABLET ORAL EVERY 4 HOURS PRN
Status: DISCONTINUED | OUTPATIENT
Start: 2021-01-23 | End: 2021-01-26 | Stop reason: HOSPADM

## 2021-01-23 RX ORDER — OLANZAPINE 10 MG/1
10 TABLET, ORALLY DISINTEGRATING ORAL ONCE
Status: COMPLETED | OUTPATIENT
Start: 2021-01-23 | End: 2021-01-23

## 2021-01-23 RX ORDER — HYDROXYZINE HYDROCHLORIDE 25 MG/1
25 TABLET, FILM COATED ORAL EVERY 4 HOURS PRN
Status: DISCONTINUED | OUTPATIENT
Start: 2021-01-23 | End: 2021-01-26 | Stop reason: HOSPADM

## 2021-01-23 RX ORDER — FLUTICASONE PROPIONATE 50 MCG
1 SPRAY, SUSPENSION (ML) NASAL 2 TIMES DAILY PRN
Status: DISCONTINUED | OUTPATIENT
Start: 2021-01-23 | End: 2021-01-26 | Stop reason: HOSPADM

## 2021-01-23 RX ORDER — OLANZAPINE 10 MG/1
10 TABLET ORAL 3 TIMES DAILY PRN
Status: DISCONTINUED | OUTPATIENT
Start: 2021-01-23 | End: 2021-01-26 | Stop reason: HOSPADM

## 2021-01-23 RX ORDER — IBUPROFEN 600 MG/1
600 TABLET, FILM COATED ORAL ONCE
Status: COMPLETED | OUTPATIENT
Start: 2021-01-23 | End: 2021-01-23

## 2021-01-23 RX ORDER — OLANZAPINE 10 MG/2ML
10 INJECTION, POWDER, FOR SOLUTION INTRAMUSCULAR 3 TIMES DAILY PRN
Status: DISCONTINUED | OUTPATIENT
Start: 2021-01-23 | End: 2021-01-26 | Stop reason: HOSPADM

## 2021-01-23 RX ORDER — AMOXICILLIN 250 MG
1 CAPSULE ORAL 2 TIMES DAILY PRN
Status: DISCONTINUED | OUTPATIENT
Start: 2021-01-23 | End: 2021-01-26 | Stop reason: HOSPADM

## 2021-01-23 RX ORDER — MAGNESIUM HYDROXIDE/ALUMINUM HYDROXICE/SIMETHICONE 120; 1200; 1200 MG/30ML; MG/30ML; MG/30ML
30 SUSPENSION ORAL EVERY 4 HOURS PRN
Status: DISCONTINUED | OUTPATIENT
Start: 2021-01-23 | End: 2021-01-26 | Stop reason: HOSPADM

## 2021-01-23 RX ORDER — FLUTICASONE PROPIONATE 50 MCG
1 SPRAY, SUSPENSION (ML) NASAL
Status: ON HOLD | COMMUNITY
Start: 2021-01-22 | End: 2021-01-25

## 2021-01-23 RX ORDER — TRAZODONE HYDROCHLORIDE 50 MG/1
50 TABLET, FILM COATED ORAL
Status: DISCONTINUED | OUTPATIENT
Start: 2021-01-23 | End: 2021-01-26 | Stop reason: HOSPADM

## 2021-01-23 RX ADMIN — IBUPROFEN 600 MG: 600 TABLET ORAL at 13:21

## 2021-01-23 RX ADMIN — OLANZAPINE 10 MG: 10 TABLET, ORALLY DISINTEGRATING ORAL at 13:18

## 2021-01-23 RX ADMIN — DOXYCYCLINE HYCLATE 100 MG: 100 CAPSULE ORAL at 13:20

## 2021-01-23 ASSESSMENT — ACTIVITIES OF DAILY LIVING (ADL)
DRESS: INDEPENDENT;PROMPTS
HYGIENE/GROOMING: INDEPENDENT;PROMPTS
LAUNDRY: UNABLE TO COMPLETE
ORAL_HYGIENE: INDEPENDENT;PROMPTS

## 2021-01-23 ASSESSMENT — MIFFLIN-ST. JEOR: SCORE: 1617.63

## 2021-01-23 NOTE — TELEPHONE ENCOUNTER
.Patient cleared and ready for behavioral bed placement: Yes     S:Audra/DEC. [place] [facility]. [place] 30. Psychosis       B: 30/M. [facility]. Pt reported using K2 and Meth, he has been homeless for 2 years. Pt presented with paranoia and delusional. He believes that people are out to get him and he think a hitman will get him. Pt has a history of psychosis and has multiple IP hospitalization in [place]. Pt denied any SI or HI. Pt has a history of aggression. History of incarceration. Medical cleared. Ambulate indept  Utox: Amphetamine and cannabis   Covid: in process  A: Vol    R: Maxx gupta@ 4:56pm. Presented clinicals for unit 10/[facility]  Notified Unit on placement. Nurse to Nurse at 7:15pm  Notify ED on placement.

## 2021-01-23 NOTE — ED PROVIDER NOTES
ED Provider Note  [facility]      History     Chief Complaint   Patient presents with     Paranoid     Was drinking alcohol, used K2 and Meth tonight. He called 911 requesting to be brought to ED because he became paranoid that people are following him wanting to harm him.     OSCAR Sahni is a 30 year old male who has a past medical history of psychosis, polysubstance abuse who presents to the ED by EMS for mental health evaluation.  History is limited due to acute intoxication however patient reports that he is feeling unsafe in extremely paranoid.  Patient reports that he is paranoid and believes that people are following him and want to harm him.  Patient also feels as if he is a worm inside his head crawling.  Patient states that he does not want to think about this or talk about it because he is making him more paranoid.  Patient does admit to drinking alcohol, and using K2 and methamphetamine tonight.  Patient denies any other medical complaints.  Denies any suicide ideation or homicidal ideation.    Past Medical History  Past Medical History:   Diagnosis Date     Psychosis (H) 4/6/2012     History reviewed. No pertinent surgical history.  No current outpatient medications on file.    No Known Allergies  Family History  History reviewed. No pertinent family history.  Social History   Social History     Tobacco Use     Smoking status: Current Every Day Smoker     Packs/day: 0.50     Smokeless tobacco: Never Used   Substance Use Topics     Alcohol use: Yes     Alcohol/week: 6.0 standard drinks     Types: 3 Cans of beer, 3 Shots of liquor per week     Drug use: No      Past medical history, past surgical history, medications, allergies, family history, and social history were reviewed with the patient. No additional pertinent items.       Review of Systems   Unable to perform ROS: Psychiatric disorder     Physical Exam   BP: 113/65  Pulse: 62  Temp: 96  F (35.6  C)  Resp:  17  SpO2: 99 %  Physical Exam  General: Afebrile, no acute distress   HEENT: Normocephalic, atraumatic, conjunctivae normal. MMM  Neck: non-tender, supple  Cardio: regular rate. regular rhythm   Resp: Normal work of breathing, no respiratory distress, lungs clear bilaterally, no wheezing, rhonchi, rales  Chest/Back: no visual signs of trauma, no CVA tenderness   Abdomen: soft, non distension, no tenderness, no peritoneal signs   Neuro: alert and fully oriented. CN II-XII grossly intact. Grossly normal strength and sensation in all extremities.   MSK: no deformities. Normal range of motion  Integumentary/Skin: no rash visualized, normal color  Psych: odd affect, thought content is paranoid, denies suicide ideation, homicidal ideation, or intent to self harm    ED Course      Procedures     No results found for any visits on 01/23/21.  Medications - No data to display     Assessments & Plan (with Medical Decision Making)   Candido Sahni is a 30 year old male who has a past medical history of psychosis, polysubstance abuse who presents to the ED by EMS for mental health evaluation.  Upon arrival patient is well-appearing, afebrile, no distress.  Patient here with paranoia, believing people are after him, also endorses polysubstance abuse with alcohol, K2, and methamphetamine use earlier this evening.  Patient denies any suicide ideation, homicidal patient, or intent to self-harm.  I suspect his paranoia is secondary to polysubstance abuse however at this time will continue close monitoring and reevaluate and possible behavioral health assessment in the morning once patient clears. Patient signed out to morning provider pending re-evaluation.     I have reviewed the nursing notes. I have reviewed the findings, diagnosis, plan and need for follow up with the patient.    New Prescriptions    No medications on file       Final diagnoses:   Paranoia (H)   Polysubstance abuse (H)       --  Ema Amos MD  [facility]  [facility] EMERGENCY DEPARTMENT  1/23/2021     Ema Amos MD  01/23/21 0603

## 2021-01-23 NOTE — ED NOTES
Bed: ED16C  Expected date: 1/23/21  Expected time: 1:56 AM  Means of arrival:   Comments:  H448  30 M  Hallucination/K2/ETOH

## 2021-01-23 NOTE — ED NOTES
"I awoke pt and asked him if I could check his vital signs and if he would provide a urine specimen.  He refused saying: \"later\" and rolled back over onto his side.   "

## 2021-01-23 NOTE — ED PROVIDER NOTES
Patient was signed out to me at shift change at 7 AM today.  Patient had initially presented with some paranoia and appeared to be delusional.  Plan was for reevaluation once he was more awake.  I saw the patient at 12:30 PM and he was awake and eating the eggs that were on his tray.  He does appear paranoid and reports that he has significant paranoia.  Patient believes that there are men who have hired men to kill him.  They want to kill him because of jealousy.  Patient also states that he hears voices saying they are going to kill him.  Patient is rather unclear regarding how long he has been hearing voices and how long he has had this paranoia.  We discussed the possibility that it could be the drug use is causing the paranoia, however, patient is convinced that it is not from drug use.  He admits to using K2 and denies other substance use.  Patient is interested in mental health admission.  He is also quite fixated on pain on the left side of his face and left ear.  This is evident when reviewing his chart and looking at recent emergency department visits.  He was seen yesterday and was prescribed doxycycline for sinusitis.  I reexamined the patient's left ear and it appears normal with no erythema, no drainage, TM looks normal.  He is quite fixated on this, so I did give him a dose of doxycycline to continue his treatment for sinusitis.  Patient appeared rather agitated and was not cooperating with repeat vital signs or with the DEC assessment.  He was given a dose of Zyprexa in addition to doxycycline and ibuprofen to help with his agitation.    Plan at this time is to admit the patient to a mental health bed for stabilization.  Since it has now been several hours since his last use of illicit drugs, it is possible that he does have an underlying mental health disorder.  He has had several emergency department visits with similar symptoms, and I think admission would help to determine if he does have an  underlying mental health disorder.  He will be admitted for stabilization to psychiatry.     Lois Escamilla MD  01/23/21 [id number]

## 2021-01-23 NOTE — SAFE
Candido Sahni  January 23, 2021    30 year old homeless [place]og[place] male who comes in with what appear to be drug induced psychotic symptoms and the belief that others are out to kill him.    Current Suicidal Ideation/Self-Injurious Concerns/Methods: Other Pt denies that he is suicidal or a danger to others.  He is extremely somnolent and requires repeated awakening.  Appears to be in drug wash out.    Inappropriate Sexual Behavior: Unknown    Aggression/Homicidal Ideation: Impaired Self-Control. History of incarceration and other directed aggression, presumed to be when he is intoxicated.    Pt uses as much K2 and meth as he is able.  His care is restricted and he has been admitted to [facility] in the past. Was at [facility] yesterday with earache.    Case reviewed with Dr. Escamilla and who requests voluntary inpatient admission.      For additional details see full DEC assessment.       Audra Lopez, LP

## 2021-01-24 LAB
ALBUMIN SERPL-MCNC: 3.3 G/DL (ref 3.4–5)
ALP SERPL-CCNC: 92 U/L (ref 40–150)
ALT SERPL W P-5'-P-CCNC: 18 U/L (ref 0–70)
ANION GAP SERPL CALCULATED.3IONS-SCNC: 5 MMOL/L (ref 3–14)
AST SERPL W P-5'-P-CCNC: 14 U/L (ref 0–45)
BASOPHILS # BLD AUTO: 0 10E9/L (ref 0–0.2)
BASOPHILS NFR BLD AUTO: 0.3 %
BILIRUB SERPL-MCNC: 1.1 MG/DL (ref 0.2–1.3)
BUN SERPL-MCNC: 15 MG/DL (ref 7–30)
CALCIUM SERPL-MCNC: 8.7 MG/DL (ref 8.5–10.1)
CHLORIDE SERPL-SCNC: 109 MMOL/L (ref 94–109)
CHOLEST SERPL-MCNC: 162 MG/DL
CO2 SERPL-SCNC: 27 MMOL/L (ref 20–32)
CREAT SERPL-MCNC: 0.64 MG/DL (ref 0.66–1.25)
DIFFERENTIAL METHOD BLD: NORMAL
EOSINOPHIL # BLD AUTO: 0.1 10E9/L (ref 0–0.7)
EOSINOPHIL NFR BLD AUTO: 2.4 %
ERYTHROCYTE [DISTWIDTH] IN BLOOD BY AUTOMATED COUNT: 13.4 % (ref 10–15)
GFR SERPL CREATININE-BSD FRML MDRD: >90 ML/MIN/{1.73_M2}
GLUCOSE SERPL-MCNC: 92 MG/DL (ref 70–99)
HCT VFR BLD AUTO: 43.3 % (ref 40–53)
HDLC SERPL-MCNC: 70 MG/DL
HGB BLD-MCNC: 14.3 G/DL (ref 13.3–17.7)
IMM GRANULOCYTES # BLD: 0 10E9/L (ref 0–0.4)
IMM GRANULOCYTES NFR BLD: 0.2 %
LDLC SERPL CALC-MCNC: 73 MG/DL
LYMPHOCYTES # BLD AUTO: 2.3 10E9/L (ref 0.8–5.3)
LYMPHOCYTES NFR BLD AUTO: 40.1 %
MCH RBC QN AUTO: 28.9 PG (ref 26.5–33)
MCHC RBC AUTO-ENTMCNC: 33 G/DL (ref 31.5–36.5)
MCV RBC AUTO: 88 FL (ref 78–100)
MONOCYTES # BLD AUTO: 0.4 10E9/L (ref 0–1.3)
MONOCYTES NFR BLD AUTO: 7.4 %
NEUTROPHILS # BLD AUTO: 2.9 10E9/L (ref 1.6–8.3)
NEUTROPHILS NFR BLD AUTO: 49.6 %
NONHDLC SERPL-MCNC: 92 MG/DL
NRBC # BLD AUTO: 0 10*3/UL
NRBC BLD AUTO-RTO: 0 /100
PLATELET # BLD AUTO: 293 10E9/L (ref 150–450)
POTASSIUM SERPL-SCNC: 3.8 MMOL/L (ref 3.4–5.3)
PROT SERPL-MCNC: 6.6 G/DL (ref 6.8–8.8)
RBC # BLD AUTO: 4.94 10E12/L (ref 4.4–5.9)
SODIUM SERPL-SCNC: 141 MMOL/L (ref 133–144)
T4 FREE SERPL-MCNC: 1.27 NG/DL (ref 0.76–1.46)
TRIGL SERPL-MCNC: 93 MG/DL
TSH SERPL DL<=0.005 MIU/L-ACNC: 0.3 MU/L (ref 0.4–4)
WBC # BLD AUTO: 5.8 10E9/L (ref 4–11)

## 2021-01-24 PROCEDURE — 84443 ASSAY THYROID STIM HORMONE: CPT | Performed by: PSYCHIATRY & NEUROLOGY

## 2021-01-24 PROCEDURE — 99221 1ST HOSP IP/OBS SF/LOW 40: CPT | Mod: 95 | Performed by: PSYCHIATRY & NEUROLOGY

## 2021-01-24 PROCEDURE — 124N000002 HC R&B MH UMMC

## 2021-01-24 PROCEDURE — 99207 PR DOWN CODE DUE TO INITIAL EXAM: CPT | Performed by: PSYCHIATRY & NEUROLOGY

## 2021-01-24 PROCEDURE — 80061 LIPID PANEL: CPT | Performed by: PSYCHIATRY & NEUROLOGY

## 2021-01-24 PROCEDURE — 250N000013 HC RX MED GY IP 250 OP 250 PS 637: Performed by: PSYCHIATRY & NEUROLOGY

## 2021-01-24 PROCEDURE — 85025 COMPLETE CBC W/AUTO DIFF WBC: CPT | Performed by: PSYCHIATRY & NEUROLOGY

## 2021-01-24 PROCEDURE — 36415 COLL VENOUS BLD VENIPUNCTURE: CPT | Performed by: PSYCHIATRY & NEUROLOGY

## 2021-01-24 PROCEDURE — 80053 COMPREHEN METABOLIC PANEL: CPT | Performed by: PSYCHIATRY & NEUROLOGY

## 2021-01-24 PROCEDURE — 84439 ASSAY OF FREE THYROXINE: CPT | Performed by: PSYCHIATRY & NEUROLOGY

## 2021-01-24 RX ORDER — DIPHENHYDRAMINE HCL 25 MG
50 CAPSULE ORAL EVERY 6 HOURS PRN
Status: DISCONTINUED | OUTPATIENT
Start: 2021-01-24 | End: 2021-01-26 | Stop reason: HOSPADM

## 2021-01-24 RX ORDER — LORAZEPAM 1 MG/1
1 TABLET ORAL EVERY 6 HOURS PRN
Status: DISCONTINUED | OUTPATIENT
Start: 2021-01-24 | End: 2021-01-26 | Stop reason: HOSPADM

## 2021-01-24 RX ORDER — IBUPROFEN 600 MG/1
600 TABLET, FILM COATED ORAL EVERY 6 HOURS PRN
Status: DISCONTINUED | OUTPATIENT
Start: 2021-01-24 | End: 2021-01-26 | Stop reason: HOSPADM

## 2021-01-24 RX ORDER — HALOPERIDOL 10 MG/1
10 TABLET ORAL EVERY 6 HOURS PRN
Status: DISCONTINUED | OUTPATIENT
Start: 2021-01-24 | End: 2021-01-26 | Stop reason: HOSPADM

## 2021-01-24 RX ORDER — GUAIFENESIN 600 MG/1
600 TABLET, EXTENDED RELEASE ORAL 2 TIMES DAILY PRN
Status: DISCONTINUED | OUTPATIENT
Start: 2021-01-24 | End: 2021-01-26 | Stop reason: HOSPADM

## 2021-01-24 RX ADMIN — HYDROXYZINE HYDROCHLORIDE 25 MG: 25 TABLET, FILM COATED ORAL at 15:05

## 2021-01-24 RX ADMIN — DIPHENHYDRAMINE HYDROCHLORIDE 50 MG: 25 CAPSULE ORAL at 16:57

## 2021-01-24 RX ADMIN — IBUPROFEN 600 MG: 600 TABLET, FILM COATED ORAL at 16:57

## 2021-01-24 RX ADMIN — HALOPERIDOL 10 MG: 10 TABLET ORAL at 16:57

## 2021-01-24 RX ADMIN — OLANZAPINE 10 MG: 10 TABLET, FILM COATED ORAL at 09:40

## 2021-01-24 RX ADMIN — OLANZAPINE 10 MG: 10 TABLET, FILM COATED ORAL at 02:31

## 2021-01-24 RX ADMIN — HYDROXYZINE HYDROCHLORIDE 25 MG: 25 TABLET, FILM COATED ORAL at 09:35

## 2021-01-24 RX ADMIN — LORAZEPAM 1 MG: 1 TABLET ORAL at 16:57
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
## 2021-01-24 NOTE — ED NOTES
Pt initially agreed to let me collect the covid swab, but then immediately reached up and pulled it out.  After speaking with him, he allowed me to insert the swab a second time for 10 seconds and 3 rotations.   Covid swab sent to lab.
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
## 2021-01-24 NOTE — ED NOTES
"Asked the pt is he was having any thoughts of harming himself or anyone else: \" I don't want to talk now\".  "

## 2021-01-24 NOTE — PROGRESS NOTES
01/23/21 8736   Patient Belongings   Did you bring any home meds/supplements to the hospital?  No   Patient Belongings locker;sent to security per site process   Patient Belongings Put in Hospital Secure Location (Security or Locker, etc.) necklace;keys;cell phone/electronics;clothing;wallet;plastic bag;cash/credit card;shoes   Belongings Search Yes   Clothing Search Yes   Second Staff Nicolas PARRISH     Security: Envelope #[id number]  - [employer] Visa #[id number]  - SSN Card  - EBT #[id number]    Locker: Cell phone, Key ring with 2 keys, wallet, $3.00 cash, [place] ID, insurance cards, misc. Business cards, toothbrushes, black hat, earbuds, chain, loose change, ear drops, bus card, lighter, jeans, belt, sweatshirt, long underwear, long sleeved shirt, boots, socks, deodorant, masks, comb, gloves, 1 loose key in jacket pocket.     A               Admission:  I am responsible for any personal items that are not sent to the safe or pharmacy.  [facility] is not responsible for loss, theft or damage of any property in my possession.    Signature:  _________________________________ Date: _______  Time: _____                                              Staff Signature:  ____________________________ Date: ________  Time: _____      2nd Staff person, if patient is unable/unwilling to sign:    Signature: ________________________________ Date: ________  Time: _____     Discharge:  [facility] has returned all of my personal belongings:    Signature: _________________________________ Date: ________  Time: _____                                          Staff Signature:  ____________________________ Date: ________  Time: _____

## 2021-01-24 NOTE — CONSULTS
Internal medicine consulted for ear pain, which was worked up and evaluated by the ED physician ~  24 hours earlier (see note from Dr. Lois Escamilla, dated 1/23).  D/w patient's RN, who said that patient was inappropriate for IM evaluation today d/t agitation.  No new recommendations at this time.  Please feel free to re-consult if patient's symptoms worsen or new problems arise.      Heather Ocasio, CNP, APRN  Internal Medicine [facility]  Pager [phone]

## 2021-01-24 NOTE — ED NOTES
"Pt tells me that he has left ear pain and has had this for \"months\".  He does not quantify his left ear pain at this time.   "

## 2021-01-24 NOTE — ED NOTES
"Pt is easily arousable to verbal stimuli.  I asked him how he was doing and he replied: \"Alright. Thank you.\"  He then pulled the blankets up.   "
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
## 2021-01-24 NOTE — PLAN OF CARE
"Writer asked pt if he would like to eat breakfast and pt asked for writer to bring him the tray.  Writer informed pt to come out to unit to eat and pt refused he was too tired.  Pt later came out on unit and appeared agitated. Pt requested number for homeland security and was hyperactive and hyperverbal.  Writer gave pt hydroxyzine to hold off on zyprexa.  Pt increasingly became more agitated accusing white people of being actors and yelling \"stop being my bitches.\"  Writer asked pt not to swear and he agreed.  Pt became extremely intense and tangential about things like never having a girlfriend, dad dying, everyone around him makes him feel very tense and upset when he is out in the community, making his paranoia go away, not lying about him.  Pt states things like \"you people.\"  Pt states first time he came to [facility] he saw a woman like me and thought she was fake because she was so white.  Pt continued to request the phone number for homeland security.  Writer informed pt that wouldn't be a good idea and pt agreed when writer said they would know he was calling from a psych floor.  Pt and writer agreed to give pt journal to write down his questions for [employer] for when he is discharged.  Pt states he took seroquel in the past and it helped but he only took it for a few weeks and than threw it away.  Pt refused to speak with provider and just wanted to sleep.  Pt remained sleeping for the remainder of the shift.      PT appeared restless in his room at beginning of evening shift.  Writer asked pt if he would like anything for anxiety pt states yes.  Writer gave pt vistaril.  Shortly after receiving vistaril pt became hyper active and came out on unit starting to escalate demanding more medications and stating he can't handle things.  Writer gave pt B52 and pt agreed to go to his room to calm down with much encouragement.  Pt remained in room rest of shift and slept.    "

## 2021-01-25 PROCEDURE — 250N000013 HC RX MED GY IP 250 OP 250 PS 637: Performed by: PSYCHIATRY & NEUROLOGY

## 2021-01-25 PROCEDURE — 99232 SBSQ HOSP IP/OBS MODERATE 35: CPT | Performed by: PSYCHIATRY & NEUROLOGY

## 2021-01-25 PROCEDURE — 124N000002 HC R&B MH UMMC

## 2021-01-25 RX ORDER — OLANZAPINE 7.5 MG/1
7.5 TABLET, FILM COATED ORAL AT BEDTIME
Status: DISCONTINUED | OUTPATIENT
Start: 2021-01-25 | End: 2021-01-26 | Stop reason: HOSPADM

## 2021-01-25 RX ORDER — DOXYCYCLINE 100 MG/1
100 CAPSULE ORAL 2 TIMES DAILY
Status: DISCONTINUED | OUTPATIENT
Start: 2021-01-25 | End: 2021-01-26 | Stop reason: HOSPADM

## 2021-01-25 RX ORDER — ACETAMINOPHEN 500 MG
500-1000 TABLET ORAL EVERY 6 HOURS PRN
Status: ON HOLD | COMMUNITY
End: 2021-06-18

## 2021-01-25 RX ORDER — FLUTICASONE PROPIONATE 50 MCG
1 SPRAY, SUSPENSION (ML) NASAL 2 TIMES DAILY
Status: DISCONTINUED | OUTPATIENT
Start: 2021-01-25 | End: 2021-01-26 | Stop reason: HOSPADM

## 2021-01-25 RX ORDER — FLUTICASONE PROPIONATE 50 MCG
1 SPRAY, SUSPENSION (ML) NASAL 2 TIMES DAILY
Status: ON HOLD | COMMUNITY
End: 2021-01-26

## 2021-01-25 RX ORDER — DOXYCYCLINE 100 MG/1
100 CAPSULE ORAL 2 TIMES DAILY
Status: ON HOLD | COMMUNITY
End: 2021-01-26

## 2021-01-25 RX ADMIN — OLANZAPINE 10 MG: 10 TABLET, FILM COATED ORAL at 19:44

## 2021-01-25 RX ADMIN — OLANZAPINE 10 MG: 10 TABLET, FILM COATED ORAL at 03:05

## 2021-01-25 RX ADMIN — FLUTICASONE PROPIONATE 1 SPRAY: 50 SPRAY, METERED NASAL at 11:19

## 2021-01-25 RX ADMIN — FLUTICASONE PROPIONATE 1 SPRAY: 50 SPRAY, METERED NASAL at 19:44

## 2021-01-25 RX ADMIN — HYDROXYZINE HYDROCHLORIDE 25 MG: 25 TABLET, FILM COATED ORAL at 17:22

## 2021-01-25 RX ADMIN — OLANZAPINE 10 MG: 10 TABLET, FILM COATED ORAL at 11:01

## 2021-01-25 RX ADMIN — ACETAMINOPHEN 650 MG: 325 TABLET, FILM COATED ORAL at 14:08

## 2021-01-25 RX ADMIN — FLUTICASONE PROPIONATE 1 SPRAY: 50 SPRAY, METERED NASAL at 14:08

## 2021-01-25 RX ADMIN — IBUPROFEN 600 MG: 600 TABLET, FILM COATED ORAL at 17:22

## 2021-01-25 RX ADMIN — TRAZODONE HYDROCHLORIDE 50 MG: 50 TABLET ORAL at 19:46

## 2021-01-25 RX ADMIN — DOXYCYCLINE HYCLATE 100 MG: 100 CAPSULE ORAL at 19:44

## 2021-01-25 ASSESSMENT — ACTIVITIES OF DAILY LIVING (ADL)
ORAL_HYGIENE: INDEPENDENT;PROMPTS
DRESS: INDEPENDENT
HYGIENE/GROOMING: INDEPENDENT;PROMPTS
LAUNDRY: UNABLE TO COMPLETE

## 2021-01-25 NOTE — PLAN OF CARE
Work Completed: CTC (writer) met with trx team, provided update, and reviewed pt's chart. CTC completed initial psychosocial note. CTC did not meet with pt due to pt being asleep when they tried. Pt has been sleeping most of the day and has been reported to still be very paranoid/disorganized, so CTC decided not to awake pt. Attending reported that pt is still confused and giving contradictory answers when talking with him. Attending was able to convince pt to stay a day or two for observation. Trx team discussed next steps for pt, including CD trx, but agree that pt needs to clear more before anything can be definitively determined.     Discharge plan or goal: TBD upon evaluation and assessment by trx team.                Barriers to discharge: Symptom Severity, Medication evaluation, and Safe discharge plan.

## 2021-01-25 NOTE — PLAN OF CARE
Problem: Adult Inpatient Plan of Care  Goal: Plan of Care Review  Outcome: No Change  Flowsheets (Taken 1/25/2021 1754)  Plan of Care Reviewed With: patient  Progress: no change  Goal: Absence of Hospital-Acquired Illness or Injury  Intervention: Identify and Manage Fall Risk  Recent Flowsheet Documentation  Taken 1/25/2021 1700 by Belén Gordon RN  Safety Promotion/Fall Prevention:    clutter free environment maintained    safety round/check completed     Problem: Adult Inpatient Plan of Care  Goal: Absence of Hospital-Acquired Illness or Injury  Intervention: Identify and Manage Fall Risk  Recent Flowsheet Documentation  Taken 1/25/2021 1700 by Belén Gordon RN  Safety Promotion/Fall Prevention:    clutter free environment maintained    safety round/check completed     Problem: Behavioral Health Plan of Care  Goal: Plan of Care Review  Recent Flowsheet Documentation  Taken 1/25/2021 1754 by Belén Gordon RN  Plan of Care Reviewed With: patient  Progress: no change  Goal: Adheres to Safety Considerations for Self and Others  Intervention: Develop and Maintain Individualized Safety Plan  Recent Flowsheet Documentation  Taken 1/25/2021 1741 by Belén Gordon RN  Safety Measures: safety rounds completed  Goal: Absence of New-Onset Illness or Injury  Intervention: Identify and Manage Fall Risk  Recent Flowsheet Documentation  Taken 1/25/2021 1741 by Belén Gordon RN  Safety Measures: safety rounds completed     Patient had been resting in his bedroom majority of the shift, isolative and withdrawn. He was out in the milieu at past 1700, requesting for PRN medications. Observed him to be impatient, anxious and irritable. He wanted to get the medication right away. At the time, writer was just logging in to the computer and pyxis. He was given a PRN Hydroxyzine 25 mg for anxiety and a PRN Advil 600 mg for left ear pain at 1722. He went back to his bedroom. Later, was prompted to  eat at supper. Ate well at supper. Then went back again to his bedroom. Writer prompted him to take his night time medications. He was compliant with taking his night time medications. Writer decided to give the PRN Olanzapine 10 mg instead of the scheduled Olanzapine 7.5 mg due to pt is tensed and agitated. He also requested a sleeping medication. PRN Trazodone 50 mg was given at 1946. After taking all his meds, he went back to his bedroom again. Noted resting for the rest of the shift. Will continue to monitor and assess pt.
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
## 2021-01-25 NOTE — PROGRESS NOTES
01/25/21 0604   Sleep/Rest/Relaxation   Sleep/Rest/Relaxation sleeping between care   Night Time # Hours 6.5 hours     Pt had a quiet night, woke up once at 0305 for snacks and was receptive to prn of Zyprexa 10 mg when suggested. No behavior or safety concerns. He was able to fall back asleep and remains sleeping at time of this entry.
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
## 2021-01-25 NOTE — PROGRESS NOTES
"NOC Shift - Progress Report    Around 0300 pt came out of room displaying hyperactive and restless behaviors. Pt was fidgeting with hands and was unable to stand still. Pt began mumbling statements at writer that were at times uncomprehendible, speech was pressured. Pt stated \"I haven't ate all day. I need something to eat. Are there any boosts; apple juice? I also need some meds, something to help me sleep.\" Pt was given apple juice and writer verbalized they would pass on pt request for Boosts. Pt given PRN Zyprexa 10 mg per agitation and appearing to be responding to internal stimuli. Pt returned to room to rest, will continue to monitor.     "

## 2021-01-25 NOTE — PLAN OF CARE
Pt has been isolative and withdrawn to room most of the shift sleeping. Pt ate breakfast. Pt woke up and was irritable tense and demanding. Pt told another staff that he should be helped first vs the other pt the nurse was working with. Pt requested PRN for paranoia and agitation and was given Zyprexa which was very helpful.  Pt showered and went back to sleep.  Pt denies SI and SIB.  Pt unwilling to finish admission profile.  Poor insight. Impaired judgement.   Pt came out and demanded writer look at his ear and then demanded pain medications and Flonase. Writer informed him it would be the last dose of flonase and pt became angry at times talking in another language and told writer to look at the clock and that he will get another dose if he wants one and told me I was slow and needed to move faster. Pt was given the flonase and then said he was going to keep it but did hand it back to writer with little effort. PT also took PRN tylenol.

## 2021-01-25 NOTE — PHARMACY-ADMISSION MEDICATION HISTORY
Admission Medication History Completed by Pharmacy    See [facility] Admission Navigator for allergy information, preferred outpatient pharmacy and prior to admission medications.     Medication History Sources:     [facility] ED visit on 1/22/2021    Changes made to [facility] medication list (reason):    Added: all medications below    Deleted: None    Changed: None    Additional Information:    Admission medication history completed to best ability.  Writer unable to interview patient due to current mental health status. [facility] medication list updated based on [facility] ED visit on 1/22/20201. Last doses and overall medication adherence unknown. Any over the counter products, vitamins or supplements may not be accurately reflected in the [facility] medication list.    Prior to Admission medications    Medication Sig      acetaminophen (TYLENOL) 500 MG tablet Take 500-1,000 mg by mouth every 6 hours as needed for mild pain          doxycycline hyclate (VIBRAMYCIN) 100 MG capsule Take 100 mg by mouth 2 times daily For maxillary sinusitis x 7 days (1/22/2021 - 1/28/2021)           fluticasone (FLONASE) 50 MCG/ACT nasal spray Spray 1 spray into both nostrils 2 times daily            Date completed: 01/25/21    Medication history completed by:   Rani Li, Pharm.D., [facility]  Behavioral Health Inpatient Pharmacist  [facility]  Phone: *[phone] ([place]avery) or [phone]

## 2021-01-25 NOTE — PROGRESS NOTES
"CLINICAL NUTRITION SERVICES - ASSESSMENT NOTE     Nutrition Prescription    RECOMMENDATIONS FOR MDs/PROVIDERS TO ORDER:  Encourage PO intake    Malnutrition Status:    Unable to determine due to unable to speak w/ pt at this time.     Recommendations already ordered by Registered Dietitian (RD):  Continue Boost between meals     Future/Additional Recommendations:  Monitor PO intake and need to adjust nutrition supplements.      REASON FOR ASSESSMENT  Candido Sahni is a/an 30 year old male assessed by the dietitian for Admission Nutrition Risk Screen for positive screen- unsure for weight loss and appetite     NUTRITION HISTORY  Attempted to speak w/ pt x2. Pt was sleeping on the first attempt and then staff let RD know that the pt was not appropriate to speak with on the second attempt.     CURRENT NUTRITION ORDERS  Diet: Regular, Boost Shake between meals   Intake/Tolerance: Pt requested Boost and apple juice this morning.     LABS  Labs reviewed  Cr 0.64 (L)    MEDICATIONS  Medications reviewed  Senokot     ANTHROPOMETRICS  Height: 185.4 cm (6' 1\")  Most Recent Weight: 59.9 kg (132 lb)    IBW: 83.6 kg (72%)   BMI: Underweight BMI <18.5  Weight History: Weight loss does not meet criteria for significant weight loss.   Wt Readings from Last 10 Encounters:   01/23/21 59.9 kg (132 lb)   05/03/17 61.2 kg (135 lb)     61.2 kg (135 lb) 09/10/2020     63.5 kg (140 lb) 02/04/2020      Dosing Weight: 60 kg (actual)     ASSESSED NUTRITION NEEDS  Estimated Energy Needs: 8765-6343 kcals/day (30 - 35 kcals/kg)  Justification: Repletion given underweight status   Estimated Protein Needs: 60-70 grams protein/day (1 - 1.2 grams of pro/kg)  Justification: Repletion given underweight status   Estimated Fluid Needs: (1 mL/kcal)   Justification: Maintenance    PHYSICAL FINDINGS  See malnutrition section below.    MALNUTRITION  % Intake: Unable to assess  % Weight Loss: Weight loss does not meet criteria  Subcutaneous Fat Loss: " Unable to assess  Muscle Loss: Unable to assess  Fluid Accumulation/Edema: Unable to assess  Malnutrition Diagnosis: Unable to determine due to unable to speak w/ pt at this time.     NUTRITION DIAGNOSIS  Predicted inadequate nutrient intake (calories) related to potential for decreased appetite as evidenced by by medical course       INTERVENTIONS  Implementation  Nutrition Education: Unable to complete due to unable to speak w/ pt at this time.    Medical food supplement therapy: Continue Boost Plus.      Goals  Patient to consume % of nutritionally adequate meal trays TID, or the equivalent with supplements/snacks.     Monitoring/Evaluation  Progress toward goals will be monitored and evaluated per protocol.    Jolly Peters RD, LD  Unit pager: [phone]

## 2021-01-26 PROCEDURE — 250N000013 HC RX MED GY IP 250 OP 250 PS 637: Performed by: PSYCHIATRY & NEUROLOGY

## 2021-01-26 PROCEDURE — 99239 HOSP IP/OBS DSCHRG MGMT >30: CPT | Performed by: PSYCHIATRY & NEUROLOGY

## 2021-01-26 RX ORDER — OLANZAPINE 7.5 MG/1
7.5 TABLET, FILM COATED ORAL AT BEDTIME
Qty: 30 TABLET | Refills: 0 | Status: ON HOLD | OUTPATIENT
Start: 2021-01-26 | End: 2021-06-18

## 2021-01-26 RX ORDER — GUAIFENESIN 600 MG/1
600 TABLET, EXTENDED RELEASE ORAL 2 TIMES DAILY PRN
Qty: 20 TABLET | Refills: 0 | Status: SHIPPED | OUTPATIENT
Start: 2021-01-26 | End: 2021-06-15

## 2021-01-26 RX ORDER — DOXYCYCLINE 100 MG/1
100 CAPSULE ORAL 2 TIMES DAILY
Qty: 5 CAPSULE | Refills: 0 | Status: SHIPPED | OUTPATIENT
Start: 2021-01-26 | End: 2021-01-28

## 2021-01-26 RX ORDER — FLUTICASONE PROPIONATE 50 MCG
1 SPRAY, SUSPENSION (ML) NASAL 2 TIMES DAILY PRN
Qty: 15.8 ML | Refills: 0 | Status: ON HOLD | OUTPATIENT
Start: 2021-01-26 | End: 2021-06-18

## 2021-01-26 RX ADMIN — DOXYCYCLINE HYCLATE 100 MG: 100 CAPSULE ORAL at 10:23

## 2021-01-26 RX ADMIN — FLUTICASONE PROPIONATE 1 SPRAY: 50 SPRAY, METERED NASAL at 10:24
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
## 2021-01-26 NOTE — PLAN OF CARE
Work Completed: Writer spoke with pt regarding discharge. He would like to leave today and will either go to his cousin's home or to [facility] in [place]. He was not sure if he would continue on his psych meds but is willing to take the antibiotics for ear pain. Writer also asked him if he would like help with a CD treatment referral. He stated he knows where to go for R25. Writer did mention that the number for [facility] R25 is on his discharge papers. We also discussed follow up at the [facility] for med refills if he chooses to do so. He knows where the [facility] is located. His speech is pressured. He knows he was paranoid upon admission.    Discharge plan or goal: pt may discharge today 1/26/2021                Barriers to discharge: pt needs to be assessed by Dr. Garcias

## 2021-01-26 NOTE — DISCHARGE INSTRUCTIONS
Behavioral Discharge Planning and Instructions      Summary:  You were admitted on 1/23/2021  due to Psychotic Symptomology and Chemical Use Issues.  You were treated by Dr. Dieter MD and discharged on 1/26/2021 from [facility] to shelter in [place] or cousins home.      Principal Diagnosis:   Unspecified Psychosis (Substance induced vs Primary thought disorder)  Polysubstance abuse    Health Care Follow-up Appointments:   [facility] [phone]  [address] [facility]  Please access the walk-in hours: Daily from 8:00am-4:30pm      Chemical Health Assessment with [facility]: call for [facility]  [phone]      [facility]- closed on weekends and Holidays  [address] [place], [place]  Walk-in Hours: Monday-Friday 8:00am-5:30pm      If no appointments scheduled, explain. Patient declined to speak with [facility] regarding discharge planning.  Attend all scheduled appointments with your outpatient providers. Call at least 24 hours in advance if you need to reschedule an appointment to ensure continued access to your outpatient providers.   Major Treatments, Procedures and Findings:  You were provided with: a psychiatric assessment, assessed for medical stability, medication evaluation and/or management and milieu management    Symptoms to Report: feeling more aggressive, increased confusion, losing more sleep, mood getting worse or thoughts of suicide    Early warning signs can include: increased depression or anxiety sleep disturbances increased thoughts or behaviors of suicide or self-harm  increased unusual thinking, such as paranoia or hearing voices    Safety and Wellness:  Take all medicines as directed.  Make no changes unless your doctor suggests them.      Follow treatment recommendations.  Refrain from alcohol and non-prescribed drugs.  Ask your support system to help you reduce your access to items that could harm yourself or others. If there  "is a concern for safety, call 911.    Resources:   Crisis Intervention: 811.283.2795 or 817-065-6833 (TTY: 898.859.4779).  Call anytime for help.  National [place] on Mental Illness (www.[place].alfredo.org): 156.906.1078 or 910-546-2055.  Alcoholics Anonymous (www.alcoholics-anonymous.org): Check your phone book for your local chapter.  Suicide Awareness Voices of Education (SAVE) (www.save.org): 197-613-AAWX (7103)  National Suicide Prevention Line (www.mentalhealthmn.org): 090-849-FPOA (8665)  Mental Health Consumer/Survivor Network of [place] (www.mhcsn.net): 823.818.2923 or 710-251-2833  Mental Health Association of [place] (www.mentalhealth.org): 774.857.4489 or 264-458-9867  Self- Management and Recovery Training., [place] [employer]-- Toll free: 463.230.6249  www.[employer]onto  [facility] Crisis Response - Adult 206 869-5981  Text 4 Life: txt \"LIFE\" to 79310 for immediate support and crisis intervention  Crisis text line: Text \"[place]\" to 243182. Free, confidential, 24/7.  Crisis Intervention: 298.455.3447 or 567-146-4908. Call anytime for help.       The treatment team has appreciated the opportunity to work with you.  Juliusarke,  please take care and make your recovery a daily recovery. If you would like to obtain any specific documentation regarding your hospitalization after your discharge, contact [facility] of Information/Medical Records at:   [phone].          "

## 2021-01-27 NOTE — DISCHARGE SUMMARY
Admit Date:     01/23/2021   Discharge Date:     01/26/2021      The patient was hospitalized between 01/23 - 01/26/2021.  On 01/26, patient was seen for 35 minutes face-to-face, greater than 50% of this time was spent on discussing his discharge meds.  He was restricted to 1 provider, so we talked about his preferences of how to get his medications and his plans for followup after discharge.      CHIEF COMPLAINT AND REASON FOR ADMISSION:  The patient is a 30-year-old male with history of polysubstance abuse and psychosis, who presented to the Emergency Department after calling EMS from a shelter he was staying with report of psychotic symptoms.  Appeared to be disorganized, confused with periods of agitation.  He reported recent use of K2 substance, amphetamines and cannabis.  He has a history of numerous Emergency Department visits for similar presentation.  The patient has not been psychiatrically hospitalized for several years.  The patient voiced significant paranoid, believed that people were following him and want to harm him.  Also reported to the emergency room doctors that there was a warm inside of his head crawling.  For more details about the patient's presentation and past psychiatric history, please refer to Dr. Ena Briceño's note from 01/24/2021.      DISCHARGE DIAGNOSES:   1.  Substance-induced psychotic disorder (methamphetamines).   2.  Polysubstance abuse.  Rule out otitis media.      CONSULTS:  The patient was seen by Internal Medicine for years.  Internal Medicine did not do consult as patient complaints of ear pain or evaluated by Emergency Department physician and patient was too agitated to be seen again.      LABORATORY WORK:  Comprehensive metabolic battery showed decreased creatinine 0.64, albumin 3.3 and total protein 6.6.  The rest of test was unremarkable.  Fasting lipid panel was all normal.  TSH was low at 0.3, but free T4 was normal.  Glucose was normal.  CBC with differential  "was unremarkable.  COVID-19 nasopharyngeal swab was negative.  Urine drug screen was positive for amphetamines and cannabis.      HOSPITAL COURSE:  The patient presented as only a partially reliable historian, complaining of pain in the left side of his face and head.  Denied any trauma history.  Reported that he had crackling in his ears refused to talk to interviewing on-call psychiatrist, then got up quickly and pushed that tablet away from him, so interview was terminated.  During this provider first visit with the patient on 01/25, the patient appeared to be slightly more cooperative, but still highly agitated with pressured speech.  Said that two groups of people hired a hit man to kill him.  He said that he had no idea what these people were and why they wanted to harm him.  He agreed to stay in hospital go to CD treatment; however, shortly after we finished our conversation, he approached me again and asked if he could be discharged, said that he \"feels like in assisted here.  I can wait for CD treatment in community.\"  I pointed out that the presence stated he would not be accepted to treatment and even if except that he would not be able to participate in the program.  He agreed to stay at this hospital and discuss discharge later.  I started him on Zyprexa 7.5 mg at night.  He had good sleep at night.  The next day during visit with this provider he approached me again said that he most definitely wanted to be discharged and did not even want to wait for his medications to be delivered to him, although later on the patient said that he was restricted to 1 provider, said that he would go to his cousin's home to [facility] in [place].  Still appeared to be somewhat paranoid, but denied auditory or visual hallucinations, denied suicidal or homicidal thoughts.  Speech was still fast, but not pressured as yesterday.  Repeatedly stated that if he desires to go into chemical dependency treatment program, he " knows where to have Rule 25 evaluation done.      He discharged to the community.  His medications were not refilled because of the above-mentioned restriction to 1 pharmacy and 1 prescriber: the list of medications are Acetaminophen 500-1000 mg every 6 hours as needed for mild pain, Vibramycin 100 mg 2 times daily for 2 days for ear infection, Flonase 1 spray into nostrils 2 times a day as needed for rhinitis, Guaifenesin 600 mg 2 times a day p.r.n. for congestion and cough, Zyprexa 7.5 mg at bedtime.  Was provided with phone numbers of [facility] and phone numbers of chemical health assessment with [facility].         SUBHA PORTILLO MD             D: 2021   T: 2021   MT:       Name:     AWILDA LIGHT   MRN:      [id number]        Account:        [id number]   :      1990           Admit Date:     2021                                  Discharge Date: 2021      Document: [id number]

## 2021-01-28 NOTE — PLAN OF CARE
Pt was escorted off the unit and given 2 bus passes. Pt discharged to his cousins place. Attempted to give pt discharge medications but pt is restricted to [facility] and [facility]. Pt states he is aware of restriction and will go there for his medications and wants to discharge. Pt verbalized readiness for discharge. Pt was very irritable and eager to discharge. Pt continues to be paranoid that people are out to get hm. Denies SI and SIB. Dr was made aware of restriction and paranoia and wanted to continue with discharge. Pt ate breakfast and lunch. Tense and irritable. Pt left with personal belongings.

## 2021-02-26 ENCOUNTER — HOSPITAL ENCOUNTER (EMERGENCY)
Facility: CLINIC | Age: 31
Discharge: NURSING, REHAB OR LONG-TERM CARE | End: 2021-02-27
Attending: EMERGENCY MEDICINE | Admitting: EMERGENCY MEDICINE
Payer: COMMERCIAL

## 2021-02-26 DIAGNOSIS — F29 PSYCHOSIS, UNSPECIFIED PSYCHOSIS TYPE (H): ICD-10-CM

## 2021-02-26 DIAGNOSIS — F19.10 POLYSUBSTANCE ABUSE (H): ICD-10-CM

## 2021-02-26 DIAGNOSIS — F28 PSYCHOSIS, INVOLUTIONAL (H): ICD-10-CM

## 2021-02-26 LAB — ALCOHOL BREATH TEST: 0 (ref 0–0.01)

## 2021-02-26 PROCEDURE — 99285 EMERGENCY DEPT VISIT HI MDM: CPT | Mod: 25

## 2021-02-26 PROCEDURE — 99284 EMERGENCY DEPT VISIT MOD MDM: CPT | Performed by: EMERGENCY MEDICINE

## 2021-02-26 PROCEDURE — 82075 ASSAY OF BREATH ETHANOL: CPT

## 2021-02-27 VITALS
SYSTOLIC BLOOD PRESSURE: 134 MMHG | DIASTOLIC BLOOD PRESSURE: 83 MMHG | OXYGEN SATURATION: 100 % | HEART RATE: 106 BPM | RESPIRATION RATE: 16 BRPM | TEMPERATURE: 97.7 F

## 2021-02-27 PROCEDURE — 90791 PSYCH DIAGNOSTIC EVALUATION: CPT

## 2021-02-27 PROCEDURE — 250N000013 HC RX MED GY IP 250 OP 250 PS 637: Performed by: EMERGENCY MEDICINE

## 2021-02-27 RX ORDER — OLANZAPINE 10 MG/1
10 TABLET, ORALLY DISINTEGRATING ORAL ONCE
Status: COMPLETED | OUTPATIENT
Start: 2021-02-27 | End: 2021-02-27

## 2021-02-27 RX ORDER — OLANZAPINE 7.5 MG/1
7.5 TABLET, FILM COATED ORAL AT BEDTIME
Qty: 12 TABLET | Refills: 0 | Status: SHIPPED | OUTPATIENT
Start: 2021-02-27 | End: 2021-06-15

## 2021-02-27 RX ADMIN — OLANZAPINE 10 MG: 10 TABLET, ORALLY DISINTEGRATING ORAL at 02:28

## 2021-02-27 NOTE — ED NOTES
Attempted to meet with patient, he did not want to get up to talk and was somewhat uncooperative. I reviewed the discharge plan of a crisis residence and pt. confirmed that he is still interested. I spoke to Geovani at [facility] and they may have an intake spot available between 4-9 pm. He will review the DEC assessment that was faxed by the overnight  and get back to us. He has the phone number for the EC if they decide to proceed with a phone screening.

## 2021-02-27 NOTE — ED NOTES
"Pt states is having auditory hallucinations.  When asked if voices telling him to do anything stated \"they are just mean.\"  Pt states is not SI or HI and \"just want help with my head crisis.  Pt refused vital signs.  "

## 2021-02-27 NOTE — ED NOTES
Seen by Dr. Storey with questionable drug-induced psychosis hallucinations etc.  Recommendation at this point patient was to potentially go to a crisis placement center such as [facility].  Reassessed patient here in the ER.  Patient states he just wants to rest more he has a headache did not want any mecication for it.  Still having some hallucinations noted nonspecific at this point.   Patient seen by  again.       Brennen Villar MD  02/27/21 4885

## 2021-02-27 NOTE — ED PROVIDER NOTES
"    [facility] [place] [place] EMERGENCY DEPARTMENT ([facility])     February 26, 2021  History     Chief Complaint   Patient presents with     Hallucinations     \"I am hearing voices that are making me loose hope,\" paranoid that people are chasing him, wanting to get thoughts out of head, avoiding people.     HPI  Candido Sahni is a 30 year old male with a PMH of alcohol abuse, marijuana abuse, K2 abuse, methamphetamine abuse, paranoia, and psychosis who presents to the ED today complaining of hearing voices.  Voices are insulting and demeaning, but are not particularly driving to any effect.  Patient denies suicidality or homicidality.  Voices have been worse over the last 3 days and more intense.  He is asking for medications to help decrease the voices and to help him focus.  Otherwise he has no specific medical complaints and denies chest pain fevers or chills abdominal pain nausea vomiting or diarrhea.     He is homeless.    Patient was recently seen at [facility] on 2/21/2021 for ear pain, tooth pain, and paranoia.  Patient was prescribed Zyprexa at a previous ED visit, but never filled the prescription.  Patient stated then that people were following him and trying to kill him.     I have reviewed the Medications, Allergies, Past Medical and Surgical History, and Social History in the [employer] system.  PAST MEDICAL HISTORY:   Past Medical History:   Diagnosis Date     Psychosis (H) 4/6/2012       PAST SURGICAL HISTORY: History reviewed. No pertinent surgical history.    Past medical history, past surgical history, medications, and allergies were reviewed with the patient. Additional pertinent items: None    FAMILY HISTORY: No family history on file.    SOCIAL HISTORY:   Social History     Tobacco Use     Smoking status: Current Every Day Smoker     Packs/day: 0.50     Smokeless tobacco: Never Used   Substance Use Topics     Alcohol use: Yes     Alcohol/week: 6.0 standard drinks     Types: 3 Cans of beer, 3 Shots of " liquor per week     Social history was reviewed with the patient. Additional pertinent items: None      Discharge Medication List as of 2/27/2021  7:32 PM      START taking these medications    Details   !! OLANZapine (ZYPREXA) 7.5 MG tablet Take 1 tablet (7.5 mg) by mouth At Bedtime, Disp-12 tablet, R-0, Local Print       !! - Potential duplicate medications found. Please discuss with provider.      CONTINUE these medications which have NOT CHANGED    Details   !! OLANZapine (ZYPREXA) 7.5 MG tablet Take 1 tablet (7.5 mg) by mouth At Bedtime, Disp-30 tablet, R-0, E-Prescribe      acetaminophen (TYLENOL) 500 MG tablet Take 500-1,000 mg by mouth every 6 hours as needed for mild pain, Historical      fluticasone (FLONASE) 50 MCG/ACT nasal spray Spray 1 spray in nostril 2 times daily as needed for rhinitis, Disp-15.8 mL, R-0, E-Prescribe      guaiFENesin (MUCINEX) 600 MG 12 hr tablet Take 1 tablet (600 mg) by mouth 2 times daily as needed for congestion, Disp-20 tablet, R-0, E-Prescribe       !! - Potential duplicate medications found. Please discuss with provider.           No Known Allergies     Review of Systems  A complete review of systems was performed with pertinent positives and negatives noted in the HPI, and all other systems negative.    Physical Exam   BP: (!) 184/103  Pulse: 99  Temp: 97.7  F (36.5  C)  Resp: 18  SpO2: 99 %      Physical Exam  GEN: Well appearing, non toxic, cooperative and conversant.   HEENT: The head is normocephalic and atraumatic. Pupils are equal round and reactive to light. Extraocular motions are intact. There is no facial swelling.   CV: Regular rate   PULM: Unlabored breathing     EXT: Full range of motion.  No edema.  NEURO: Cranial nerves II through XII are intact and symmetric. Bilateral upper and lower extremities grossly show full range of motion without any focal deficits.     PSYCH: Calm and cooperative, interactive.     ED Course        Procedures               No results  found for this or any previous visit (from the past 24 hour(s)).  Medications   OLANZapine zydis (zyPREXA) ODT tab 10 mg (10 mg Oral Given 2/27/21 0228)             Assessments & Plan (with Medical Decision Making)   30-year-old male presenting with psychosis as described.  History of polysubstance abuse homelessness   Generally has a history of not being compliant with medications.  Today does not seem to be a risk to himself or others.  He is cooperative, but wants to get restarted on medications that w will help with his hallucinations.  He was evaluated by the mental health  who agrees.  We will attempt to place the patient in a crisis center such as [facility].  Request sent to them and this is pending at this time, patient signed out to Dr. Villar awaiting ultimate disposition to crisis center.      I have reviewed the nursing notes.    I have reviewed the findings, diagnosis, plan and need for follow up with the patient.    Discharge Medication List as of 2/27/2021  7:32 PM      START taking these medications    Details   !! OLANZapine (ZYPREXA) 7.5 MG tablet Take 1 tablet (7.5 mg) by mouth At Bedtime, Disp-12 tablet, R-0, Local Print       !! - Potential duplicate medications found. Please discuss with provider.          Final diagnoses:   Psychosis, unspecified psychosis type (H)   Polysubstance abuse (H)       2/26/2021   [facility] EMERGENCY DEPARTMENT     Ras Storey MD  02/28/21 0210

## 2021-02-27 NOTE — ED NOTES
Just spoke with Loren from [facility]. The facility will accept the pt at 1830 today. Screening is completre per their staff and pt is appropriate for their facility. Plan: [address] at [address] [place]. Pt informed of plan and agrees with it.

## 2021-02-27 NOTE — ED NOTES
Spoke with pt thoughts disorganized, speech rambled.  Pt states is hearing voices, but will not specify what they are saying to him.

## 2021-02-27 NOTE — DISCHARGE INSTRUCTIONS
To be discharged to go to [facility].  Take the zyprexa 7.5mg at bedtime as prescribed.  Follow up with MD for follow up.  Avoid chemical use.  Return if any concerns at all.

## 2021-05-08 ENCOUNTER — HOSPITAL ENCOUNTER (EMERGENCY)
Facility: CLINIC | Age: 31
Discharge: LEFT BEFORE DISCHARGE | End: 2021-05-08
Attending: FAMILY MEDICINE
Payer: COMMERCIAL

## 2021-05-08 VITALS
DIASTOLIC BLOOD PRESSURE: 52 MMHG | WEIGHT: 150 LBS | SYSTOLIC BLOOD PRESSURE: 126 MMHG | HEIGHT: 73 IN | RESPIRATION RATE: 16 BRPM | OXYGEN SATURATION: 100 % | BODY MASS INDEX: 19.88 KG/M2 | HEART RATE: 96 BPM | TEMPERATURE: 98.4 F

## 2021-05-08 ASSESSMENT — MIFFLIN-ST. JEOR: SCORE: 1699.28

## 2021-05-11 ENCOUNTER — HOSPITAL ENCOUNTER (EMERGENCY)
Facility: CLINIC | Age: 31
Discharge: HOME | End: 2021-05-11
Attending: EMERGENCY MEDICINE | Admitting: EMERGENCY MEDICINE
Payer: COMMERCIAL

## 2021-05-11 VITALS
HEART RATE: 115 BPM | RESPIRATION RATE: 16 BRPM | WEIGHT: 150 LBS | TEMPERATURE: 97.2 F | HEIGHT: 73 IN | OXYGEN SATURATION: 98 % | SYSTOLIC BLOOD PRESSURE: 145 MMHG | DIASTOLIC BLOOD PRESSURE: 80 MMHG | BODY MASS INDEX: 19.88 KG/M2

## 2021-05-11 DIAGNOSIS — R45.1 AGITATION: ICD-10-CM

## 2021-05-11 PROCEDURE — 99282 EMERGENCY DEPT VISIT SF MDM: CPT | Performed by: EMERGENCY MEDICINE

## 2021-05-11 PROCEDURE — 99281 EMR DPT VST MAYX REQ PHY/QHP: CPT | Performed by: EMERGENCY MEDICINE

## 2021-05-11 ASSESSMENT — ENCOUNTER SYMPTOMS: HEADACHES: 1

## 2021-05-11 ASSESSMENT — MIFFLIN-ST. JEOR: SCORE: 1694.28

## 2021-05-12 NOTE — ED PROVIDER NOTES
"    [place] EMERGENCY DEPARTMENT ([facility])  5/11/21     History     Chief Complaint   Patient presents with     Dental Pain     Otalgia     The history is provided by the patient and medical records.     Candido Sahni is a 31 year old male with a past medical history significant for polysubstance abuse, paranoia, and psychosis who presents here to the Emergency Department due to misaligned teeth that are causing him pain.  Patient reports he has TMJ that he does not take care of.  He states his teeth are misaligned and that is causing pain in his left jaw, ear, and head.  He states he is unable to breathe because his teeth have holes in them which makes him get less oxygen.  He states the misaligned teeth cause his head to always be somewhat turned to the side and he wants something that will straighten out his brain.  He states he would like his brain to calm down.  Patient also notes he has a left ear infection that is hurting his thyroid.  Patient states he \"doesn't really use meth\" though he has tried it.        Past Medical History  Past Medical History:   Diagnosis Date     Psychosis (H) 4/6/2012     History reviewed. No pertinent surgical history.  acetaminophen (TYLENOL) 500 MG tablet  fluticasone (FLONASE) 50 MCG/ACT nasal spray  guaiFENesin (MUCINEX) 600 MG 12 hr tablet  OLANZapine (ZYPREXA) 7.5 MG tablet  OLANZapine (ZYPREXA) 7.5 MG tablet      No Known Allergies  Family History  History reviewed. No pertinent family history.  Social History   Social History     Tobacco Use     Smoking status: Current Every Day Smoker     Packs/day: 0.50     Smokeless tobacco: Never Used   Substance Use Topics     Alcohol use: Yes     Alcohol/week: 6.0 standard drinks     Types: 3 Cans of beer, 3 Shots of liquor per week     Drug use: No      Past medical history, past surgical history, medications, allergies, family history, and social history were reviewed with the patient. No additional pertinent items. " "      Review of Systems   HENT: Positive for dental problem (\"misaligned\") and ear pain (L).         Pos for L jaw pain   Neurological: Positive for headaches (L side).     A complete review of systems was performed with pertinent positives and negatives noted in the HPI, and all other systems negative.    Physical Exam   BP: (!) 145/80  Pulse: 115  Temp: 97.2  F (36.2  C)  Resp: 16  Height: 185.4 cm (6' 1\")  Weight: 68 kg (150 lb)  SpO2: 98 %  Physical Exam    While talking to the patient he became agitated get out of bed and walked out into the exit saying that he was going to go to another hospital.    ED Course     10:33 PM  The patient was seen and examined by Vivek Barboza MD in Room ED09.    Procedures        Patient presents with agitation and delusions likely secondary to methamphetamine use in reviewing the chart.  He felt that he needed his teeth realigned as this was causing TMJ.  He was very agitated and diaphoretic.  Plan was to get him a dose of olanzapine which has been prescribed in the past.       No results found for any visits on 05/11/21.  Medications - No data to display     Assessments & Plan (with Medical Decision Making)   Candido Sahni is a 31 year old male who presents to the ED complaining of dental problems that are causing him jaw pain that radiates to his ear and head.  Shortly after speaking to him he got up and left the emergency department    I have reviewed the nursing notes. I have reviewed the findings, diagnosis, plan and need for follow up with the patient.    Discharge Medication List as of 5/11/2021 10:42 PM          Final diagnoses:   Agitation   I, Suri Marshall, am serving as a trained medical scribe to document services personally performed by Vivek Barboza MD, based on the provider's statements to me.     I, Vivek Barboza MD, was physically present and have reviewed and verified the accuracy of this note documented by Suri Marshall. "     --  Vivek Barboza MD  [facility] EMERGENCY DEPARTMENT  5/11/2021     Vivek Barboza MD  05/11/21 5846

## 2021-05-12 NOTE — ED TRIAGE NOTES
"Presents to triage with concerns that his jaw is not in alignment and his teeth are causing him pain that radiates to his ear, reports difficulty turning his neck, was able to move his neck in triage. Pt very anxious stating \"I need a doctor, this is an emergency\" VSS on RA  "

## 2021-06-15 ENCOUNTER — HOSPITAL ENCOUNTER (INPATIENT)
Facility: CLINIC | Age: 31
LOS: 3 days | Discharge: HOME | End: 2021-06-18
Attending: EMERGENCY MEDICINE | Admitting: PSYCHIATRY & NEUROLOGY
Payer: COMMERCIAL

## 2021-06-15 DIAGNOSIS — Z11.52 ENCOUNTER FOR SCREENING LABORATORY TESTING FOR SEVERE ACUTE RESPIRATORY SYNDROME CORONAVIRUS 2 (SARS-COV-2): ICD-10-CM

## 2021-06-15 DIAGNOSIS — R45.1 AGITATION: ICD-10-CM

## 2021-06-15 DIAGNOSIS — H60.502 ACUTE OTITIS EXTERNA OF LEFT EAR, UNSPECIFIED TYPE: ICD-10-CM

## 2021-06-15 DIAGNOSIS — F29 ATYPICAL PSYCHOSIS (H): ICD-10-CM

## 2021-06-15 LAB
LABORATORY COMMENT REPORT: NORMAL
SARS-COV-2 RNA RESP QL NAA+PROBE: NEGATIVE
SPECIMEN SOURCE: NORMAL

## 2021-06-15 PROCEDURE — C9803 HOPD COVID-19 SPEC COLLECT: HCPCS | Performed by: EMERGENCY MEDICINE

## 2021-06-15 PROCEDURE — 99285 EMERGENCY DEPT VISIT HI MDM: CPT | Performed by: EMERGENCY MEDICINE

## 2021-06-15 PROCEDURE — 250N000013 HC RX MED GY IP 250 OP 250 PS 637: Performed by: EMERGENCY MEDICINE

## 2021-06-15 PROCEDURE — 250N000013 HC RX MED GY IP 250 OP 250 PS 637: Performed by: STUDENT IN AN ORGANIZED HEALTH CARE EDUCATION/TRAINING PROGRAM

## 2021-06-15 PROCEDURE — 99285 EMERGENCY DEPT VISIT HI MDM: CPT | Mod: 25 | Performed by: EMERGENCY MEDICINE

## 2021-06-15 PROCEDURE — 90791 PSYCH DIAGNOSTIC EVALUATION: CPT

## 2021-06-15 PROCEDURE — HZ2ZZZZ DETOXIFICATION SERVICES FOR SUBSTANCE ABUSE TREATMENT: ICD-10-PCS | Performed by: PSYCHIATRY & NEUROLOGY

## 2021-06-15 PROCEDURE — 87635 SARS-COV-2 COVID-19 AMP PRB: CPT | Performed by: EMERGENCY MEDICINE

## 2021-06-15 PROCEDURE — 124N000002 HC R&B MH UMMC

## 2021-06-15 RX ORDER — OLANZAPINE 10 MG/2ML
10 INJECTION, POWDER, FOR SOLUTION INTRAMUSCULAR 3 TIMES DAILY PRN
Status: DISCONTINUED | OUTPATIENT
Start: 2021-06-15 | End: 2021-06-18 | Stop reason: HOSPADM

## 2021-06-15 RX ORDER — ACETAMINOPHEN 325 MG/1
650 TABLET ORAL EVERY 4 HOURS PRN
Status: DISCONTINUED | OUTPATIENT
Start: 2021-06-15 | End: 2021-06-15

## 2021-06-15 RX ORDER — FOLIC ACID 1 MG/1
1 TABLET ORAL DAILY
Status: DISCONTINUED | OUTPATIENT
Start: 2021-06-16 | End: 2021-06-18 | Stop reason: HOSPADM

## 2021-06-15 RX ORDER — ACETAMINOPHEN 500 MG
500-1000 TABLET ORAL EVERY 6 HOURS PRN
Status: DISCONTINUED | OUTPATIENT
Start: 2021-06-15 | End: 2021-06-18 | Stop reason: HOSPADM

## 2021-06-15 RX ORDER — OLANZAPINE 10 MG/1
10 TABLET ORAL 3 TIMES DAILY PRN
Status: DISCONTINUED | OUTPATIENT
Start: 2021-06-15 | End: 2021-06-18

## 2021-06-15 RX ORDER — DIAZEPAM 5 MG
5-20 TABLET ORAL EVERY 30 MIN PRN
Status: DISCONTINUED | OUTPATIENT
Start: 2021-06-15 | End: 2021-06-17

## 2021-06-15 RX ORDER — MAGNESIUM HYDROXIDE/ALUMINUM HYDROXICE/SIMETHICONE 120; 1200; 1200 MG/30ML; MG/30ML; MG/30ML
30 SUSPENSION ORAL EVERY 4 HOURS PRN
Status: DISCONTINUED | OUTPATIENT
Start: 2021-06-15 | End: 2021-06-18 | Stop reason: HOSPADM

## 2021-06-15 RX ORDER — POLYETHYLENE GLYCOL 3350 17 G/17G
17 POWDER, FOR SOLUTION ORAL DAILY PRN
Status: DISCONTINUED | OUTPATIENT
Start: 2021-06-15 | End: 2021-06-18 | Stop reason: HOSPADM

## 2021-06-15 RX ORDER — OFLOXACIN 3 MG/ML
5 SOLUTION AURICULAR (OTIC) 2 TIMES DAILY
Qty: 5 ML | Refills: 0 | Status: SHIPPED | OUTPATIENT
Start: 2021-06-15 | End: 2021-06-20

## 2021-06-15 RX ORDER — FLUTICASONE PROPIONATE 50 MCG
1 SPRAY, SUSPENSION (ML) NASAL 2 TIMES DAILY PRN
Status: DISCONTINUED | OUTPATIENT
Start: 2021-06-15 | End: 2021-06-18 | Stop reason: HOSPADM

## 2021-06-15 RX ORDER — OLANZAPINE 10 MG/1
10 TABLET, ORALLY DISINTEGRATING ORAL ONCE
Status: COMPLETED | OUTPATIENT
Start: 2021-06-15 | End: 2021-06-15

## 2021-06-15 RX ORDER — HYDROXYZINE HYDROCHLORIDE 25 MG/1
25 TABLET, FILM COATED ORAL EVERY 4 HOURS PRN
Status: DISCONTINUED | OUTPATIENT
Start: 2021-06-15 | End: 2021-06-18 | Stop reason: HOSPADM

## 2021-06-15 RX ORDER — LANOLIN ALCOHOL/MO/W.PET/CERES
100 CREAM (GRAM) TOPICAL DAILY
Status: DISCONTINUED | OUTPATIENT
Start: 2021-06-16 | End: 2021-06-18 | Stop reason: HOSPADM

## 2021-06-15 RX ADMIN — OLANZAPINE 7.5 MG: 2.5 TABLET ORAL at 22:00

## 2021-06-15 RX ADMIN — OLANZAPINE 10 MG: 10 TABLET, FILM COATED ORAL at 18:33

## 2021-06-15 RX ADMIN — DIAZEPAM 10 MG: 5 TABLET ORAL at 22:01

## 2021-06-15 RX ADMIN — OLANZAPINE 10 MG: 10 TABLET, ORALLY DISINTEGRATING ORAL at 05:22

## 2021-06-15 ASSESSMENT — ENCOUNTER SYMPTOMS
SLEEP DISTURBANCE: 1
SHORTNESS OF BREATH: 0
HYPERACTIVE: 1
CONFUSION: 1
NECK PAIN: 0
EYE PAIN: 0
NAUSEA: 0
ABDOMINAL PAIN: 0
HEADACHES: 0
BACK PAIN: 0
TROUBLE SWALLOWING: 0
DIFFICULTY URINATING: 0
FEVER: 0
NERVOUS/ANXIOUS: 1
CHILLS: 0
SORE THROAT: 0
VOMITING: 0
AGITATION: 1
DECREASED CONCENTRATION: 1
VOICE CHANGE: 0

## 2021-06-15 NOTE — PROGRESS NOTES
Clinician attempted assessment interview.  Patient was not cooperative and disconnected the IPad.  Spoke with Dr Cordero.  Assessment will be deferred.    Sparkle Hart

## 2021-06-15 NOTE — ED NOTES
[facility] ED Mental Health Handoff Note:       Brief HPI:  This is a 31 year old male signed out to me by Dr. Goltz.  See initial ED Provider note for full details of the presentation. Interval history is pertinent for persistent agitation.    Home meds reviewed and ordered/administered: No    Medically stable for inpatient mental health admission: No. Awaiting lab results.    Evaluated by mental health: Yes. The recommendation is for inpatient mental health treatment. Bed search in process    Safety concerns: At the time I received sign out, there were no safety concerns.    Hold Status:  Active Orders   N/A            Exam:   Patient Vitals for the past 24 hrs:   BP Temp Temp src Pulse Resp SpO2   06/15/21 0843 99/48 97.9  F (36.6  C) Oral 57 16 99 %   06/15/21 0412 132/86 97.7  F (36.5  C) Oral 69 16 100 %           ED Course:    Medications   OLANZapine zydis (zyPREXA) ODT tab 10 mg (10 mg Oral Given 6/15/21 0522)            There were no significant events during my shift.          Impression:    ICD-10-CM    1. Agitation  R45.1    2. Acute otitis externa of left ear, unspecified type  H60.502        Plan:    1. Awaiting inpatient mental health admission/transfer.      RESULTS:   No results found for this visit on 06/15/21 (from the past 24 hour(s)).          MD Gil Valenzuela, Angel SNYDER MD  06/15/21 7510

## 2021-06-15 NOTE — ED PROVIDER NOTES
"ED Provider Note  [facility]      History     Chief Complaint   Patient presents with     Paranoid     \"I'm paranoid. I'm homeless. They putting my picture on the internet. My left ear hurts.\"     HPI  Candido Sahni is a 31 year old male who presents to the ED with concerns of paranoia and disorganized thoughts. He express frustration over their severity and says \"I just need some medicine to calm down so I can get some sleep\". He also reports ongoing pain in both ears, mouth, and mandible related to a previous surgery in 2018. He admits to continued alcohol use, K2 and possible other substances along with difficulty sleeping. It's unclear when this episode began. Not taking any meds.  When asked about homicidal suicidal ideations, he will not answer, and instead rambles about other tangential topics.  Similar response when asked about auditory/visual hallucinations.  Cannot say when his last alcohol/drug use occurred.     Regarding his ear and jaw pain, he states he has had this since his surgery in 2018.  Located in both ears and the mandibular area bilaterally.  Denies any fever/chills, difficulty swallowing, pooling secretions, facial swelling, respiratory symptoms, and has no other medical complaints.          Past Medical History  Past Medical History:   Diagnosis Date     Psychosis (H) 4/6/2012     History reviewed. No pertinent surgical history.  ofloxacin (FLOXIN) 0.3 % otic solution  acetaminophen (TYLENOL) 500 MG tablet  fluticasone (FLONASE) 50 MCG/ACT nasal spray  guaiFENesin (MUCINEX) 600 MG 12 hr tablet  OLANZapine (ZYPREXA) 7.5 MG tablet  OLANZapine (ZYPREXA) 7.5 MG tablet      No Known Allergies  Family History  No family history on file.  Social History   Social History     Tobacco Use     Smoking status: Current Every Day Smoker     Packs/day: 0.50     Smokeless tobacco: Never Used   Substance Use Topics     Alcohol use: Yes     Alcohol/week: 6.0 standard drinks     " Types: 3 Cans of beer, 3 Shots of liquor per week     Drug use: No      Past medical history, past surgical history, medications, allergies, family history, and social history were reviewed with the patient. No additional pertinent items.       Review of Systems   Constitutional: Negative for chills and fever.   HENT: Positive for ear pain. Negative for sore throat, trouble swallowing and voice change.         B/l jaw pain   Eyes: Negative for pain.   Respiratory: Negative for shortness of breath.    Cardiovascular: Negative for chest pain.   Gastrointestinal: Negative for abdominal pain, nausea and vomiting.   Genitourinary: Negative for difficulty urinating.   Musculoskeletal: Negative for back pain and neck pain.   Neurological: Negative for headaches.   Psychiatric/Behavioral: Positive for agitation, confusion, decreased concentration and sleep disturbance. The patient is nervous/anxious and is hyperactive.      A complete review of systems was performed with pertinent positives and negatives noted in the HPI, and all other systems negative.    Physical Exam   BP: 132/86  Pulse: 69  Temp: 97.7  F (36.5  C)  Resp: 16  SpO2: 100 %  Physical Exam  Vitals signs and nursing note reviewed.   Constitutional:       General: He is not in acute distress.     Appearance: Normal appearance.   HENT:      Head: Normocephalic and atraumatic.      Right Ear: Tympanic membrane and ear canal normal. There is no impacted cerumen.      Left Ear: Tympanic membrane normal. There is no impacted cerumen.      Ears:      Comments: Tympanic membranes are clear bilaterally.  There is some mild swelling/irritation of the left external auditory canal without any drainage.  Could be early otitis externa.  Of note, patient seems to be fidgeting with his ears fairly frequently and this could be self inoculated.     Nose: Nose normal.      Mouth/Throat:      Mouth: Mucous membranes are moist.      Pharynx: Oropharynx is clear. No oropharyngeal  exudate or posterior oropharyngeal erythema.   Eyes:      Pupils: Pupils are equal, round, and reactive to light.   Neck:      Musculoskeletal: Normal range of motion and neck supple. No neck rigidity or muscular tenderness.   Cardiovascular:      Rate and Rhythm: Normal rate and regular rhythm.   Pulmonary:      Effort: Pulmonary effort is normal.   Abdominal:      General: There is no distension.   Musculoskeletal: Normal range of motion.         General: No deformity.   Lymphadenopathy:      Cervical: No cervical adenopathy.   Skin:     General: Skin is warm.   Neurological:      Mental Status: He is alert and oriented to person, place, and time.   Psychiatric:         Attention and Perception: He is inattentive.         Mood and Affect: Mood is anxious. Affect is inappropriate.         Speech: Speech is rapid and pressured and tangential.         Behavior: Behavior is agitated and hyperactive.         Thought Content: Thought content is paranoid and delusional.         Judgment: Judgment is impulsive and inappropriate.         ED Course      Procedures        The medical record was reviewed and interpreted.  Managed outpatient prescription medications.       No results found for any visits on 06/15/21.  Medications   OLANZapine zydis (zyPREXA) ODT tab 10 mg (10 mg Oral Given 6/15/21 0522)        Assessments & Plan (with Medical Decision Making)   Patient presents for evaluation of disorganized thoughts, difficulty sleeping.    On arrival, patient has normal vital signs.  No signs of external trauma, toxic ingestion, or other clinical toxidrome.  His speech is rapid, pressured, and tangential.  He is overall hyperactive, agitated, paranoid and delusional.  He is inattentive, does not answer all questions.  When asked if he is homicidal/suicidal or having hallucinations, he will not answer and began speaking about other topics.  He was given 10 mg oral Zyprexa.  Will need mental health assessment when  able.    Regarding his bilateral ear and jaw pain, it appears that this is a chronic issue for him since his previous surgery.  On exam, no signs of airway compromise.  No soft tissue swelling to suggest neck mass/abscess.  No tongue swelling to suggest Dileep's angina.  No malocclusion of jaw to suggest significant TMJ pathology.  TMs are clear bilaterally.  There is some mild erythema/irritation of the left external auditory canal, could be mild developing external otitis.  Patient is obsessive about his ears, is constantly picking at them which is likely etiology.  Will be started on floxin otic drops which should be continued twice daily for the next 5 days    We will sign out to the morning provider with plans to follow-up on mental health assessment and reassess when awake.    I have reviewed the nursing notes. I have reviewed the findings, diagnosis, plan and need for follow up with the patient.    New Prescriptions    OFLOXACIN (FLOXIN) 0.3 % OTIC SOLUTION    Place 5 drops in ear(s) 2 times daily for 5 days       Final diagnoses:   Agitation   Acute otitis externa of left ear, unspecified type       --  Rene Nieto DO  [facility] EMERGENCY DEPARTMENT  6/15/2021     Rene Nieto DO  06/15/21 0513

## 2021-06-15 NOTE — PHARMACY-ADMISSION MEDICATION HISTORY
Admission Medication History Completed by Pharmacy    See [facility] Admission Navigator for allergy information, preferred outpatient pharmacy and prior to admission medications.     Medication History Sources:     Prescription fill history ([facility] and [facility] in [place]) via Epic Surescripts data    Care Everywhere: [facility] ED visit May 2021    Changes made to PTA medication list (reason):    Added: Cipro HC Otic drops    Deleted: None    Changed: None    Additional Information:  PTA medication list updated based on fill history only.   Pharmacist did not interview patient in ED due to current mental health status. Last doses and overall medication adherence is unknown. Dates medications last filled are in table below.   Any over the counter products, vitamins or supplements may not be accurately reflected in the PTA medication list.    Prior to Admission medications    Medication Sig Last Filled  Auth Provider   ciprofloxacin-hydrocortisone (CIPRO HC OTIC) 0.2-1 % otic suspension Place 3 drops Into the left ear 2 times daily Last filled 5/27. Prescribed at [facility]. Therapy completed?  Unknown, Entered By History              acetaminophen (TYLENOL) 500 MG tablet Take 500-1,000 mg by mouth every 6 hours as needed for mild pain     Unknown  Unknown, Entered By History   fluticasone (FLONASE) 50 MCG/ACT nasal spray Spray 1 spray in nostril 2 times daily as needed for rhinitis Last filled in March       Pito Garcias MD   OLANZapine (ZYPREXA) 7.5 MG tablet Take 1 tablet (7.5 mg) by mouth At Bedtime Last filled 2/27 for qty #12      Pito Garcias MD     Date completed: 06/15/21    Medication history completed by:   Rani Li, Pharm.D., [facility]  Behavioral Health Inpatient Pharmacist  [facility] ([facility]) Emergency Department  Phone: *[phone] ([employer]) or [phone]

## 2021-06-15 NOTE — PLAN OF CARE
"Patient is admitted to [address] [facility] for suicidal ideation and psychotic symtpoms.      Per ED note patient has a history of Schizophrenia vs Substance Use Psychosis.  Patient refused to participate in the DEC assessment.  Patient refuses to participate in the Admission Profile.  Patient refused to sign Consent for treatment forms.  Patient stated, \"I just want to sleep.  I can't read right now, I will do it later.\"  Patient placed the blanket over his head and turned away from the writer.  Patient primary nurse is aware.  Continue to monitor.   "

## 2021-06-15 NOTE — ED NOTES
Spoke to DEC  Sparkle, she will be calling back within an hour to speak to patient if patient is able

## 2021-06-15 NOTE — PROGRESS NOTES
06/15/21 1726   Patient Belongings   Did you bring any home meds/supplements to the hospital?  No   Patient Belongings locker   Patient Belongings Put in Hospital Secure Location (Security or Locker, etc.) bracelet;clothing;medical/assistive equipment;shoes;wallet;watch   Belongings Search Yes   Clothing Search Yes   Second Staff Barrett     Locker: 1 Black Wallet, 1 Wrist Watch, 1 Sharpie Marker, 1 Phone , 1 Pair Black Shoes, 1 Black Athletic Shirt, 1 Navy Polo Shirt, 1 Pair Blue Pants, 1 , 12 Lottery Tickets, 1 Toothbrush w/ Paste and Cream, 1 Pack Floss Picks, 3 Pennies, 2 Blue Face Masks, 1 Trojan Condom, 1 Rock, , 1 Healthy Savings Card, 1 Coinmach Card, 1 [employer] Card, 1 [facility] ID Card, 1 [place] Card, 1 , 2 Community Cards (Kaitlyn Rey & Kieran Davalos), 1 Small Metal Cylinder, 1 Half of a Silver Watch/Braclet, and 1 Small Metal Chain.    Security: 1 Visa Debit Card (*[id number]), 1 [employer] Card (*9605), 1 Ebt Card (*[id number]), Social Security Card (*[id number]), 1 Metro Card, Blood Glucose Equipment in Small Black Pouch (2 Monitors, 3 Sterile Needles)    A               Admission:  I am responsible for any personal items that are not sent to the safe or pharmacy.  [facility] is not responsible for loss, theft or damage of any property in my possession.    Signature:  _________________________________ Date: _______  Time: _____                                              Staff Signature:  ____________________________ Date: ________  Time: _____      2nd Staff person, if patient is unable/unwilling to sign:    Signature: ________________________________ Date: ________  Time: _____     Discharge:  [facility] has returned all of my personal belongings:    Signature: _________________________________ Date: ________  Time: _____                                          Staff Signature:  ____________________________ Date: ________  Time: _____

## 2021-06-15 NOTE — SAFE
"Candido TAMAYO Travpapa  Denisha 15, 2021    Patient is a 31 year old male with hx of schizophrenia vs substance induced psychosis, and substance abuse.  Record indicates hx of methamphetamine, K2, cannabis and alcohol.  Patient presents with psychosis.  He is agitated, paranoid, tangential, and appears to be attending to internal stimuli.  Patient received zyprexa earlier in his ED visit.  After several hours in the ED, psychosis symptoms have not improved.        Current Suicidal Ideation/Self-Injurious Concerns/Methods: Other Patient does not give clear responses.  He initially responded \"something like that\" then later denied suicidal ideation.    Inappropriate Sexual Behavior: Unknown    Aggression/Homicidal Ideation: Agitation/Hyperactivity and Impaired Self-Control   Patient is agitated and paranoid in the ED.  He references hearing voices of his enemies and doing what he needs to do.    For additional details see full DEC assessment.       Sparkle Hart      "

## 2021-06-16 PROCEDURE — 99222 1ST HOSP IP/OBS MODERATE 55: CPT | Mod: AI | Performed by: PSYCHIATRY & NEUROLOGY

## 2021-06-16 PROCEDURE — 250N000009 HC RX 250: Performed by: PHYSICIAN ASSISTANT

## 2021-06-16 PROCEDURE — 250N000013 HC RX MED GY IP 250 OP 250 PS 637: Performed by: STUDENT IN AN ORGANIZED HEALTH CARE EDUCATION/TRAINING PROGRAM

## 2021-06-16 PROCEDURE — 124N000002 HC R&B MH UMMC

## 2021-06-16 RX ORDER — AMOXICILLIN 250 MG
1 CAPSULE ORAL 2 TIMES DAILY
Status: DISCONTINUED | OUTPATIENT
Start: 2021-06-16 | End: 2021-06-18 | Stop reason: HOSPADM

## 2021-06-16 RX ORDER — OLANZAPINE 10 MG/1
10 TABLET, ORALLY DISINTEGRATING ORAL ONCE
Status: COMPLETED | OUTPATIENT
Start: 2021-06-16 | End: 2021-06-16

## 2021-06-16 RX ORDER — OFLOXACIN 3 MG/ML
5 SOLUTION AURICULAR (OTIC) 2 TIMES DAILY
Status: DISCONTINUED | OUTPATIENT
Start: 2021-06-16 | End: 2021-06-16

## 2021-06-16 RX ORDER — OLANZAPINE 20 MG/1
20 TABLET, ORALLY DISINTEGRATING ORAL AT BEDTIME
Status: DISCONTINUED | OUTPATIENT
Start: 2021-06-16 | End: 2021-06-18 | Stop reason: HOSPADM

## 2021-06-16 RX ORDER — OFLOXACIN 3 MG/ML
5 SOLUTION AURICULAR (OTIC) 2 TIMES DAILY
Status: DISCONTINUED | OUTPATIENT
Start: 2021-06-16 | End: 2021-06-18 | Stop reason: HOSPADM

## 2021-06-16 RX ADMIN — HYDROXYZINE HYDROCHLORIDE 25 MG: 25 TABLET, FILM COATED ORAL at 20:28

## 2021-06-16 RX ADMIN — Medication 1 MG: at 09:53

## 2021-06-16 RX ADMIN — OLANZAPINE 20 MG: 20 TABLET, ORALLY DISINTEGRATING ORAL at 22:25

## 2021-06-16 RX ADMIN — CARBAMIDE PEROXIDE 6.5% 3 DROP: 6.5 LIQUID AURICULAR (OTIC) at 18:05

## 2021-06-16 RX ADMIN — OFLOXACIN 5 DROP: 3 SOLUTION AURICULAR (OTIC) at 20:29

## 2021-06-16 RX ADMIN — OLANZAPINE 10 MG: 10 TABLET, ORALLY DISINTEGRATING ORAL at 09:50

## 2021-06-16 RX ADMIN — HYDROXYZINE HYDROCHLORIDE 25 MG: 25 TABLET, FILM COATED ORAL at 23:53

## 2021-06-16 RX ADMIN — THIAMINE HCL TAB 100 MG 100 MG: 100 TAB at 09:53

## 2021-06-16 RX ADMIN — OFLOXACIN 5 DROP: 3 SOLUTION AURICULAR (OTIC) at 17:00

## 2021-06-16 RX ADMIN — ACETAMINOPHEN 1000 MG: 500 TABLET, FILM COATED ORAL at 16:24

## 2021-06-16 RX ADMIN — Medication 5 MG: at 23:53

## 2021-06-16 RX ADMIN — DOCUSATE SODIUM 50MG AND SENNOSIDES 8.6MG 1 TABLET: 8.6; 5 TABLET, FILM COATED ORAL at 20:28

## 2021-06-16 RX ADMIN — ACETAMINOPHEN 1000 MG: 500 TABLET, FILM COATED ORAL at 22:37

## 2021-06-16 RX ADMIN — POLYETHYLENE GLYCOL 3350 17 G: 17 POWDER, FOR SOLUTION ORAL at 09:48

## 2021-06-16 ASSESSMENT — ACTIVITIES OF DAILY LIVING (ADL)
LAUNDRY: WITH SUPERVISION
DRESS: INDEPENDENT
HYGIENE/GROOMING: INDEPENDENT
ORAL_HYGIENE: INDEPENDENT

## 2021-06-16 NOTE — PLAN OF CARE
"Pt appeared to sleep 5.75 hours. MSSA score 7 @0200. Pt asked if there is something he can have for sleep, shouting \"Nurse\". When I came back to his room, he had fallen asleep. No PRNs given.     Problem: Sleep Disturbance (Psychotic Signs/Symptoms)  Goal: Improved Sleep (Psychotic Signs/Symptoms)  Outcome: No Change     "
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
## 2021-06-16 NOTE — PLAN OF CARE
Problem: Behavioral Health Plan of Care  Goal: Patient-Specific Goal (Individualization)  Flowsheets (Taken 6/16/2021 1319)  Patient Vulnerabilities:   adverse childhood experience(s)   history of unsuccessful treatment   legal concerns   limited support system   traumatic event   housing insecurity   lacks insight into illness   substance abuse/addiction  Patient Personal Strengths:   expressive of needs   expressive of emotions   self-reliant  Note:     The patient specific goals include:   Patient will participate in unit programming  Patient will identify triggers and positive coping skills  Patient will take medications as prescribed by physician both for mental health and medical  Patient coached to work on coping packet    The patient identified the following reasons for hospitalization:    -paranoid          The patient identified the following goals for discharge:   -Medication management

## 2021-06-16 NOTE — PROGRESS NOTES
Pt admiited from er and he went to bed , wanting to sleep, states he does not want to be bothered, He only wanted to drink Ensure , he did not eat any of his samayoa pper . MSSA score of 12 , given Valium 10 mg . Ptsleeping , med compliant , denies si

## 2021-06-16 NOTE — PLAN OF CARE
BEHAVIORAL TEAM DISCUSSION    Participants:   Dr. Camilo Rose, Attending Psychiatrist  Cosme Montez, PGY1  Bri Gonzalez, TAD [place], LPCC, LADC, CTC  Progress: Initial  Anticipated length of stay: Unknown, pending stabilization and appropriate disposition plan.   Continued Stay Criteria/Rationale: Psychosis, paranoia, disorganization  Medical/Physical: No acute issues - was cleared by ED for psychiatric admission  Precautions:   Behavioral Orders   Procedures     Code 1 - Restrict to Unit     Routine Programming     As clinically indicated     Single Room     Status 15     Every 15 minutes.     Plan: The plan is to assess and patient for mental health and medication needs. The patient will be prescribed medications to treat the identified symptoms.  Upon discharge the patient will be referred to services as appropriate based on the assessment. Today, patient is agreeable to [facility] treatment, however is not able to participate in an assessment at this time.  Rationale for change in precautions or plan: No change at present-will continue to monitor and adjust as needed.

## 2021-06-16 NOTE — PLAN OF CARE
"  Problem: Behavioral Health Plan of Care  Goal: Plan of Care Review  Outcome: No Change  Flowsheets (Taken 6/16/2021 0859)  Plan of Care Reviewed With: patient  Progress: no change  Patient Agreement with Plan of Care: (Brought self to ED requesting tx-but irritable at this time) agrees with comment (describe)     Problem: Cognitive Impairment (Psychotic Signs/Symptoms)  Goal: Optimal Cognitive Function (Psychotic Signs/Symptoms)  Outcome: No Change  Flowsheets (Taken 6/16/2021 0859)  Mutually Determined Action Steps (Optimal Cognitive Function): (Irritable and sleeping at this time) other (see comments)     Candido woken for labs-initially agreeable-as lab preparing to draw, Candido became suddenly agitated c/o  smelling bad and demanding all leave his room-returned to sleep until approx 0930 Candido requested juice Boost from psych assoc-suddenly began to yell in hostile manner, as though arguing with another, no one in room-yelling, \"He needs to stay away from her.\" and other difficult to follow statements-tense, irritable when staff entered room-angry staff brought Ensure juice stating he wants Ensure milk-also demanding dissolvable medication he received earlier-MDs met with [facility] during meeting with MDs-ordered one time dose Zyprexa 10 mg PO which he received at 0948 along with vitiamins-refused VS-unable to do MSSA scoring-does not appear to be in withdrawal from alcohol-refused bkft, but drank some of the Ensure-returned to sleep post receiving above and slept rest of shift   "

## 2021-06-16 NOTE — PLAN OF CARE
Assessment/Intervention/Current Symtoms and Care Coordination  -Refer to psychosocial completed on 6/16/21 by NIA Spears, NI for assessment/social functioning  -Chart review  -Pre round meeting with team  -Rounded with team, addressed patient needs/concerns  -Post round meeting with team  -Team note  -Personal plan of care  -Initial psychosocial    Current Symptoms include the following: Psychosis, AH, disorganization, patient is irritable and paranoia    Discharge Plan or Goal  Pending stabilization & development of a safe discharge plan.  Considerations include: Co Occurring substance use program    Barriers to Discharge  Patient requires further psychiatric stabilization due to current symptomology    Referral Status  None today    Legal Status  Patient is voluntary

## 2021-06-16 NOTE — CONSULTS
Brief Medicine Note:    Internal medicine consulted due to concerns for otitis externa. In review of patient chart, was seen at [facility] on 05/15/2021 for left ear pain as well. Appears patient complains of ear pain during frequent ED visits. Felt at that time to represent otitis externa and was given course of penicillin as well due to concern for decaying wisdom tooth and was recommended to follow-up with his maxillofacial surgeon given ongoing issues with left ear/jaw. Unclear if patient completed course of abx or used drops given paranoia and substance use.    Patient was again complaining of bilateral ear and jaw pain upon arrival to ED yesterday. Exam showing concerns for mild erythema/irritation of left external auditory canal which may be mild developing otitis externa. Patient also noted to be obsessive with his ears and picking constantly. Plan was to start floxin otic drops for 5 days.    Assessment/Plan:  - Start ofloxacin 0.3% drops BID x 5 days as planned per ED   - Minimize patient ear picking   - Notify IM if not improving in 4-5 days with drops and will further investigate at that time  - Monitor for fevers or complaints of teeth/jaw pain  - Please ensure patient has follow-up with PCP or maxillofacial surgeon; they may need to refer him to ENT as an outpatient    Please reach out with any further questions or concerns.    Tarsha Chua PA-C  Internal Medicine [facility] Hospitalist  [facility]  Pager: [phone]

## 2021-06-17 VITALS
RESPIRATION RATE: 16 BRPM | OXYGEN SATURATION: 100 % | TEMPERATURE: 98 F | DIASTOLIC BLOOD PRESSURE: 90 MMHG | HEART RATE: 126 BPM | SYSTOLIC BLOOD PRESSURE: 128 MMHG

## 2021-06-17 PROCEDURE — 250N000013 HC RX MED GY IP 250 OP 250 PS 637: Performed by: STUDENT IN AN ORGANIZED HEALTH CARE EDUCATION/TRAINING PROGRAM

## 2021-06-17 PROCEDURE — 99232 SBSQ HOSP IP/OBS MODERATE 35: CPT | Mod: GC | Performed by: PSYCHIATRY & NEUROLOGY

## 2021-06-17 PROCEDURE — 124N000002 HC R&B MH UMMC

## 2021-06-17 RX ORDER — IBUPROFEN 200 MG
600 TABLET ORAL EVERY 6 HOURS PRN
Status: DISCONTINUED | OUTPATIENT
Start: 2021-06-17 | End: 2021-06-18 | Stop reason: HOSPADM

## 2021-06-17 RX ADMIN — DOCUSATE SODIUM 50MG AND SENNOSIDES 8.6MG 1 TABLET: 8.6; 5 TABLET, FILM COATED ORAL at 13:29

## 2021-06-17 RX ADMIN — HYDROXYZINE HYDROCHLORIDE 25 MG: 25 TABLET, FILM COATED ORAL at 18:22

## 2021-06-17 RX ADMIN — CARBAMIDE PEROXIDE 6.5% 3 DROP: 6.5 LIQUID AURICULAR (OTIC) at 20:16

## 2021-06-17 RX ADMIN — ACETAMINOPHEN 1000 MG: 500 TABLET, FILM COATED ORAL at 19:19

## 2021-06-17 RX ADMIN — CARBAMIDE PEROXIDE 6.5% 3 DROP: 6.5 LIQUID AURICULAR (OTIC) at 13:12

## 2021-06-17 RX ADMIN — Medication 5 MG: at 23:51

## 2021-06-17 RX ADMIN — IBUPROFEN 600 MG: 200 TABLET, FILM COATED ORAL at 18:22

## 2021-06-17 RX ADMIN — OFLOXACIN 5 DROP: 3 SOLUTION AURICULAR (OTIC) at 20:17

## 2021-06-17 RX ADMIN — Medication 1 MG: at 13:28

## 2021-06-17 RX ADMIN — OFLOXACIN 5 DROP: 3 SOLUTION AURICULAR (OTIC) at 13:07

## 2021-06-17 RX ADMIN — ACETAMINOPHEN 1000 MG: 500 TABLET, FILM COATED ORAL at 04:42

## 2021-06-17 RX ADMIN — OLANZAPINE 10 MG: 10 TABLET, FILM COATED ORAL at 13:04

## 2021-06-17 RX ADMIN — DOCUSATE SODIUM 50MG AND SENNOSIDES 8.6MG 1 TABLET: 8.6; 5 TABLET, FILM COATED ORAL at 20:16

## 2021-06-17 RX ADMIN — THIAMINE HCL TAB 100 MG 100 MG: 100 TAB at 13:29

## 2021-06-17 RX ADMIN — ACETAMINOPHEN 1000 MG: 500 TABLET, FILM COATED ORAL at 13:05

## 2021-06-17 RX ADMIN — OLANZAPINE 20 MG: 20 TABLET, ORALLY DISINTEGRATING ORAL at 20:16

## 2021-06-17 ASSESSMENT — ACTIVITIES OF DAILY LIVING (ADL)
ORAL_HYGIENE: INDEPENDENT
DRESS: INDEPENDENT
HYGIENE/GROOMING: INDEPENDENT
LAUNDRY: WITH SUPERVISION

## 2021-06-17 NOTE — PLAN OF CARE
Nursing plan of care   Problem: Behavior Regulation Impairment (Psychotic Signs/Symptoms)  Goal: Improved Behavioral Control (Psychotic Signs/Symptoms)  6/17/2021 0253 by Donnie Davenport RN  Outcome: No Change  At the start of the shift, pt approached the nursing desk with C/O not able to sleep; was offered and administered 5 mg of melatonin and 25 mg of hydroxyzine; pt also reported something pressing against his bone on right lateral side of his back; requested and provided with hot pack; denied SOB; oxygen saturation was 97 on room air; was not able to complete further assessment due to pt behavioral presentation; was encouraged to report his concern to the attending Dr during morning round  and was receptive to writers recommendation. Scored 7 on MSSA.     At 0440, awoke from sleep and approached nursing desk with C/O generalized body ache and requested for PRN intervention; No sign of alcohol withdrawal was noted; received 1000 mg PRN tylenol at 0442; continues declining other offered snacks except ensure; was irritable and untidy; will continue to monitor and assess.     Pt appeared have slept for 6 hrs this shift.

## 2021-06-17 NOTE — PROGRESS NOTES
----------------------------------------------------------------------------------------------------------  [facility], [place]   Psychiatric Progress Note  Hospital Day #2     Interim History:   The patient's care was discussed with the treatment team and chart notes were reviewed.    Sleep 6 hours (06/17/21 0559)  Scheduled Medications: took all scheduled medications as prescribed   PRN medications: melatonin     Staff Report:   Pt started the shift with angry, restless, hyper verbal and tense affect; C/O missing his lunch and demanded to provide him with ensure/milkshake; pt was informed that he don't has order for ensure and  will call his provide for order. Writer called the attending resident and received order for ensure b/n meal. Pt was cooperative with MSSA assessment with significant encouragement; pt scored 7 on MSSA, No sign of withdrawal was noted(see flow sheet), C/O general body ache and requested for PRN intervention; offered and administered 1000 mg tylenol at x 2; with little effect; during  ofloxacin (FLOXIN) 0.3 % otic solution administration, pt C/O having bugs in both ears and demanded to administer the medication in both ears Vs left ear order; teaching was provided on medication but pt didn't take the teaching well;  Writer called the attending resident and notified of pt request of medication for right ear C/O bug and itching; writer received and was made an attempted to administer PRN carbamide peroxide (DEBROX) 6.5 % otic solution, but pt was poorly cooperative and follows direction. pt was hostile and angry; took away medication from this writer's hand and start to squeez in to his ear; was told multiple times that the medication only administered through nurse for accurate dose, but pt denied and kept squeezing the bottle in to his ear; another nurse intervened and pt handed back to the medication to the nurse. During this interaction pt slammed his bed  "with angry affect; pt was also violently postured to this writer and staff this shift; pt declined to eat  his dinner and only took ensure came on his dinner tray. Took his HS medication; No sign of SI/SIB was noted; will continue to monitor and assess.    Patient Interview:   Candido Sahni was in bed sleeping when interviewed; he says he feels more calm today, denies the chest pain/discomfort that he noticed yesterday, says that he trusts the staff here and believe that they are helping him. He says his mood is \"good.\" He asked if he could see a dentist yet.     Review of systems:     ROS was negative unless noted above.          Allergies:   No Known Allergies         Psychiatric Examination:   /85   Pulse 86   Temp 97.7  F (36.5  C) (Oral)   Resp 16   SpO2 97%   Weight is 0 lbs 0 oz  There is no height or weight on file to calculate BMI.    MENTAL STATUS EXAM    Appearance:  undernourished, thin, appears stated age and disheveled  Attitude:  disorganized behavior and reactive  Psychomotor:  restless, switching positions in bed frequently   Eye Contact: avoids or evasive  Speech:  high tone and increased rate  Mood: \"not good\"  Affect:  congruent and labile  Thought Content: paranoid delusions and preoccupations  Thought Process: goal directed and ruminative  Sensorium: awake  Cognition: memory grossly intact  Impulse control: fair  Insight: questionable  Judgment: fair         Labs:     No results found for this or any previous visit (from the past 24 hour(s)).     Assessment    Principal psychiatric diagnosis:   # Substance-induced psychosis     Secondary psychiatric diagnosis:   # Amphetamine use disorder    Diagnostic Impression:   Candido Sahni is a 31 year old male previously diagnosed with substance-induced psychosis and with a history of polysubstance use (K2, amphetamines) admitted from the [facility] ED on 6/15/21 due to concern for paranoia and disorganization  in the context  of substance " use. He has had multiple ED visits in the past 6 months with a similar presentation including one admission in January for substance-induced psychosis. Significant symptoms include irritable, psychosis and disorganization.  Substance use does appear to be playing a contributing role in the patient's presentation. The MSE on admission is notable for an irritable patient reporting several somatic delusions including bugs in his ears and his esophagus being in the wrong location.  His symptoms are consistent with his historic diagnosis of substance-induced psychosis, and primary psychosis (likely schizophrenia) remains on the differential given his longterm history of psychotic features and historic diagnosis.      On admission he was specifically requesting medication to take for paranoia. He reported that Zyprexa made him sleepy but was unable to engage in a meaningful conversation regarding alternatives.      Of note he was diagnosed with otitis externa 5/27 at [facility] and given cirpo ear drops, unclear if he has been taking these however he continues to report ear pain and did appear to have physical exam findings c/w otitis externa in the ED.     Psychiatric Hospital course:   Candido Sahni was admitted to [facility] as a voluntary patient. The patient's PTA zyprexa was continued. He initially presented with high MSSA scores (12 and 7 during his first night here), which was treated with p.o. diazepam without issue. MSSA scores were below the threshold for intervention for 48 hours following this, so MSSA protocol was discontinued on 6/17/21.     Discontinued Medications (& Rationale):  Diazepam (per MSSA protocol), completed course     Medical course   A medicine consult was placed for concern for possible ear infection upon admission given ear pain and recent diagnosis of otitis externa two weeks prior to admission and uncertainty if the patient had been adherent with antibiotic ear drops. He was prescribed  ofloxacin eye drops, which he was adherent to without complication.     Data:   - CMP, CBC unremarkable  - TSH 0.3 however T4 wnl    Consults:   Medicine consult 6/16 for otitis externa    Plan     Today's Changes:  - no changes     Scheduled Medicationss:    folic acid  1 mg Oral Daily     ofloxacin  5 drop Left Ear BID     OLANZapine zydis  20 mg Oral At Bedtime     senna-docusate  1 tablet Oral BID     thiamine  100 mg Oral Daily       PRN Medications:  - acetaminophen, alum & mag hydroxide-simethicone, carbamide peroxide, fluticasone, hydrOXYzine, melatonin, OLANZapine **OR** OLANZapine, polyethylene glycol    Patient will be treated in therapeutic milieu with appropriate individual and group therapies as described.    Medical diagnoses to be addressed this admission:    # c/f otitis externa  - Medicine consult placed    Legal Status:   Orders Placed This Encounter      Voluntary      Safety Assessment:   Behavioral Orders   Procedures     Code 1 - Restrict to Unit     Routine Programming     As clinically indicated     Single Room     Status 15     Every 15 minutes.       Disposition: Discharge in approximately 4-6 days pending stabilization & development of a safe discharge plan.     _________________________________________________________________    This patient was seen and discussed with my attending physician.  -  Cosme Montez DO   Psychiatry Resident     Attestation:  This patient has been seen and evaluated by me, Camilo Rose MD.  I have discussed this patient with the house staff team including the resident and medical student and I agree with the findings and plan in this note.    I have reviewed today's vital signs, medications, labs and imaging. Camilo Rose MD , PhD.       - - -

## 2021-06-17 NOTE — PROGRESS NOTES
Pt was intermittently present in the milieu throughout the shift. He was observed sleeping on and off throughout the day and eating snack/dinner. Pt appeared notably tense and irritable and was seen yelling in the milieu multiple times during the shift. Besides these angry outbursts this  did not observe any acute safety concerns with this pt.

## 2021-06-17 NOTE — PLAN OF CARE
Problem: Behavioral Health Plan of Care  Goal: Plan of Care Review  Outcome: Improving  Flowsheets (Taken 6/17/2021 2556)  Plan of Care Reviewed With: patient  Progress: improving  Patient Agreement with Plan of Care: agrees     Problem: Behavior Regulation Impairment (Psychotic Signs/Symptoms)  Goal: Improved Behavioral Control (Psychotic Signs/Symptoms)  Outcome: Improving     Candido slept entire day, except briefly awake to speak with MD and around lunch time, although set lunch aside stating he wants it, but he does not want to eat it now-requested Zyprexa 10 mg PO at 1305 for c/o agitation-does continue irritable and easily agitated-distressed about ear insisting insects are in his ear-Received Tylenol 1000 mg PO for c/o generalized body aches-accepted AM medications late as he was sleeping earlier-Returned to sleep post above and continues sleeping at this time

## 2021-06-17 NOTE — PLAN OF CARE
"Nursing plan care   Problem: Behavior Regulation Impairment (Psychotic Signs/Symptoms)  Goal: Improved Behavioral Control (Psychotic Signs/Symptoms)  Outcome: No Change  Received pt sleeping in his room; awoke for dinner and presented with irritable affect and restless behavior; ate only 1/3 of smash potato and grabbed 2 ensure came on his dinner. Declined the rest of his dinner; C/O generalized body ache and pain on his lower back; pt requested for PRN intervention was provided hot pack and administered 600 mg of ibuprofen with dinner. Took shower; attended no group; pt was mostly irritable and Impatient to wait for his needs to met; staff reported of pt making delusional statement  stating, \" what can I do to stop my sister doing sorcery to me\"; pt also requested medication for nightmares and attending resident was notified. Pt was encouraged to discuss if he has past trauma related to nightmare; No sign of SI/SIB was noted; will continue to monitor and assess.             "

## 2021-06-18 LAB
B BURGDOR IGG+IGM SER QL: 0.1 (ref 0–0.89)
CHOLEST SERPL-MCNC: 131 MG/DL
HBA1C MFR BLD: 5.2 % (ref 0–5.6)
HDLC SERPL-MCNC: 50 MG/DL
LDLC SERPL CALC-MCNC: 63 MG/DL
NONHDLC SERPL-MCNC: 81 MG/DL
T PALLIDUM AB SER QL: NONREACTIVE
TRIGL SERPL-MCNC: 88 MG/DL
VIT B12 SERPL-MCNC: 379 PG/ML (ref 193–986)

## 2021-06-18 PROCEDURE — 250N000013 HC RX MED GY IP 250 OP 250 PS 637: Performed by: STUDENT IN AN ORGANIZED HEALTH CARE EDUCATION/TRAINING PROGRAM

## 2021-06-18 PROCEDURE — 86780 TREPONEMA PALLIDUM: CPT | Performed by: STUDENT IN AN ORGANIZED HEALTH CARE EDUCATION/TRAINING PROGRAM

## 2021-06-18 PROCEDURE — 250N000013 HC RX MED GY IP 250 OP 250 PS 637: Performed by: PSYCHIATRY & NEUROLOGY

## 2021-06-18 PROCEDURE — 83036 HEMOGLOBIN GLYCOSYLATED A1C: CPT | Performed by: STUDENT IN AN ORGANIZED HEALTH CARE EDUCATION/TRAINING PROGRAM

## 2021-06-18 PROCEDURE — 99238 HOSP IP/OBS DSCHRG MGMT 30/<: CPT | Mod: GC | Performed by: PSYCHIATRY & NEUROLOGY

## 2021-06-18 PROCEDURE — 82607 VITAMIN B-12: CPT | Performed by: STUDENT IN AN ORGANIZED HEALTH CARE EDUCATION/TRAINING PROGRAM

## 2021-06-18 PROCEDURE — 80061 LIPID PANEL: CPT | Performed by: STUDENT IN AN ORGANIZED HEALTH CARE EDUCATION/TRAINING PROGRAM

## 2021-06-18 PROCEDURE — 86618 LYME DISEASE ANTIBODY: CPT | Performed by: STUDENT IN AN ORGANIZED HEALTH CARE EDUCATION/TRAINING PROGRAM

## 2021-06-18 PROCEDURE — 36415 COLL VENOUS BLD VENIPUNCTURE: CPT | Performed by: STUDENT IN AN ORGANIZED HEALTH CARE EDUCATION/TRAINING PROGRAM

## 2021-06-18 RX ORDER — OFLOXACIN 3 MG/ML
5 SOLUTION AURICULAR (OTIC) 2 TIMES DAILY
Qty: 5 ML | Refills: 0 | Status: SHIPPED | OUTPATIENT
Start: 2021-06-18 | End: 2021-06-29

## 2021-06-18 RX ORDER — BENZTROPINE MESYLATE 0.5 MG/1
0.5 TABLET ORAL 2 TIMES DAILY
Status: DISCONTINUED | OUTPATIENT
Start: 2021-06-18 | End: 2021-06-18 | Stop reason: HOSPADM

## 2021-06-18 RX ORDER — OLANZAPINE 10 MG/1
10 TABLET, ORALLY DISINTEGRATING ORAL 2 TIMES DAILY PRN
Status: DISCONTINUED | OUTPATIENT
Start: 2021-06-18 | End: 2021-06-18 | Stop reason: HOSPADM

## 2021-06-18 RX ADMIN — HYDROXYZINE HYDROCHLORIDE 25 MG: 25 TABLET, FILM COATED ORAL at 06:34

## 2021-06-18 RX ADMIN — ACETAMINOPHEN 1000 MG: 500 TABLET, FILM COATED ORAL at 06:24

## 2021-06-18 RX ADMIN — OFLOXACIN 5 DROP: 3 SOLUTION AURICULAR (OTIC) at 11:52

## 2021-06-18 RX ADMIN — CARBAMIDE PEROXIDE 6.5% 3 DROP: 6.5 LIQUID AURICULAR (OTIC) at 11:52

## 2021-06-18 RX ADMIN — BENZTROPINE MESYLATE 0.5 MG: 0.5 TABLET ORAL at 11:51

## 2021-06-18 ASSESSMENT — ACTIVITIES OF DAILY LIVING (ADL)
DRESS: INDEPENDENT
HYGIENE/GROOMING: INDEPENDENT
ORAL_HYGIENE: INDEPENDENT
LAUNDRY: WITH SUPERVISION

## 2021-06-18 NOTE — DISCHARGE INSTRUCTIONS
Behavioral Discharge Planning and Instructions    Summary: You were admitted on 6/15/2021 due to Psychotic Symptomology in context of Illicit drug use. You were treated by Dr. Rose and discharged on 6/18/21 from [facility] to home.    Main Diagnosis:   Substance-induced psychosis    Health Care Follow-up:   Medication Management -   Appointment Date/Time: Tuesday, 6/29 at 4pm  In person appointment     Prescriber: Dr. Hernández   Address: [address], [facility]  Phone Number: [phone]        [address] 25 Referral:   [place]:  [phone]  [address]:  [phone]    Attend all scheduled appointments with your outpatient providers. Call at least 24 hours in advance if you need to reschedule an appointment to ensure continued access to your outpatient providers.     Major Treatments, Procedures and Findings:  You were provided with: a psychiatric assessment, assessed for medical stability, medication evaluation and/or management, group therapy, milieu management.    Symptoms to Report: Feeling more aggressive, increased confusion, losing more sleep, mood getting worse, or thoughts of suicide.    Early warning signs can include: Increased depression or anxiety sleep disturbances increased thoughts or behaviors of suicide or self-harm  increased unusual thinking, such as paranoia or hearing voices.    Safety and Wellness: Take all medicines as directed. Make no changes unless your doctor suggests them. Follow treatment recommendations. Refrain from alcohol and non-prescribed drugs.  Ask your support system to help you reduce your access to items that could harm yourself or others. If there is a concern for safety, call 911.    Resources:   Crisis Intervention: 273.936.6708 or 380-094-4521 (TTY: 453.433.9128).  Call anytime for help.  National [place] on Mental Illness (www.[place].alfredo.org): 446.184.3493 or 762-759-5487.  Alcoholics Anonymous (www.alcoholics-anonymous.org):  Check your phone book for your local chapter.  Suicide Awareness Voices of Education (SAVE) (www.save.org): 984-623-HSVC (9525)  National Suicide Prevention Line (www.mentalhealthmn.org): 093-058-BLXB (4905)  Mental Health Consumer/Survivor Network of [place] (www.mhcsn.net): 688.299.8820 or 474-912-9673  Mental Health Association of [place] (www.mentalhealth.org): 327.823.4962 or 281-786-2335  [facility] (COPE) Response - Adult 754 675-4215    General Medication Instructions:     See your medication sheet(s) for instructions.     Take all medicines as directed.  Make no changes unless your doctor suggests them.     Go to all your doctor visits.    Be sure to have all your required lab tests. This way, your medicines can be refilled on time.    Do not use any drugs not prescribed by your doctor.    Avoid alcohol.    Advance Directives:   Scanned document on file with [employer]? No scanned doc  Is document scanned? No. Copy Requested.  Honoring Choices Your Rights Handout: Informed and given  Was more information offered? Pt declined    The Treatment team has appreciated the opportunity to work with you. If you have any questions or concerns about your recent admission, you can contact the unit which can receive your call 24 hours a day, 7 days a week. They will be able to get in touch with a Provider if needed. The unit number is [phone] .

## 2021-06-18 NOTE — DISCHARGE SUMMARY
"  -----------------------------------------------------------------------------------------------------------  Psychiatric Discharge Summary    Candido Sahni MRN# [id number]   Age: 31 year old YOB: 1990     Date of Admission:  6/15/2021  Date of Discharge:  6/18/2021  Admitting Physician:  Camilo Rose MD  Discharge Physician:  Camilo Rose MD         Event Leading to Hospitalization:   History obtained from patient and electronic chart     Candido Sahni is a 31 year old male previously diagnosed with substance-induced psychosis and with a history of polysubstance use (K2, amphetamines) admitted from the [facility] ED on 6/15/21 due to concern for paranoia and disorganization  in the context  of substance use. He has had multiple ED visits in the past 6 months with a similar presentation including one admission in January for substance-induced psychosis.     Per ED Note:   Candido Sahni is a 31 year old male who presents to the ED with concerns of paranoia and disorganized thoughts. He express frustration over their severity and says \"I just need some medicine to calm down so I can get some sleep\". He also reports ongoing pain in both ears, mouth, and mandible related to a previous surgery in 2018. He admits to continued alcohol use, K2 and possible other substances along with difficulty sleeping. It's unclear when this episode began. Not taking any meds.  When asked about homicidal suicidal ideations, he will not answer, and instead rambles about other tangential topics.  Similar response when asked about auditory/visual hallucinations.  Cannot say when his last alcohol/drug use occurred.      Regarding his ear and jaw pain, he states he has had this since his surgery in 2018.  Located in both ears and the mandibular area bilaterally.  Denies any fever/chills, difficulty swallowing, pooling secretions, facial swelling, respiratory symptoms, and has no other medical complaints.       He was " "medically cleared for admission to inpatient psychiatric unit.     Per patient report:    Candido was asleep prior to interview. He became upset when I asked if he was in the hospital because of recent substance use and wanted to know where this information had come from (he reported this to the ED physician). He reported that he was \"paranoid\" and that he wants a medication that he can take when he is \"scared.\" He did not describe what this meant in any more detail.       During our conversation he was focused on getting help for his ear pain, which he attributed to bugs being in his ears. He also reported that his esophagus was \"out of line\" and that he wants to see a specialist while he is in the hospital. He proceeded to grimace dramatically and asked me to look at his teeth, specifically reporting concerns that they did not line up (not appreciated on exam.)     He did not respond to questions about suicidal or homicidal ideation. When asked if there was any other way we could be helpful while he was here he stated he \"wants to be a winner\" several times. He then pulled the sheets over his head and said he didn't want to talk anymore.          Diagnoses:   1. Psychosis due to substance abuse  2. JAVAD - methamphetamine           Labs:     Admission on 02/26/2021, Discharged on 02/27/2021   Component Date Value Ref Range Status     Alcohol Breath Test 02/26/2021 0.000  0.00 - 0.01 Final   CBC, CMP. COVID, and lipids were all normal with the exception of a mild hypoalbunemia 3.3 and hypoproteinemia 6.6 likely due to poor nutrition, however TSH was mildly supressed but t4 was WNL.             Consults:   Internal Medicine:  Seen by medicine for ear infection and was diagnosed with otitis externa and prescribed ear drops.             Hospital Course:   Candido Sahni was admitted to [facility] as a voluntary patient. The patient's PTA zyprexa was continued. He initially presented with high MSSA scores (12 and 7 " during his first night here), which was treated with p.o. diazepam without issue. MSSA scores were below the threshold for intervention for 48 hours following this, so MSSA protocol was discontinued on 6/17/21.  Medicine was consulted as per above and otitis externa treated.      Risk Assessment:  Today Candido Sahni denies SI/HI. he has notable risk factors for self-harm, including age, single status, psychosis and substance abuse. However, risk is mitigated by commitment to family, Holiness beliefs, ability to volunteer a safety plan and history of seeking help when needed. Therefore, based on all available evidence including the factors cited above, he does not appear to be at imminent risk for self-harm, does not meet criteria for a 72-hr hold, and therefore remains appropriate for ongoing outpatient level of care. Voluntary referral for outpatient followup was offered, he accepted this offer.  Candido was released to home. During this admission, he did not participate in groups and was not visible in the milieu, and his symptoms of psychosis improved. At the time of discharge he was determined to not be a danger to himself or others.   This document will serve as communication during the transfer of the acute care of this patient to her outpatient provider team.        Psychiatric Discharge Medications:   New Medications this Hospitalization:  Olanzapine 20mg hs and 10mg prn BID for psychosis - though recommended pt did not want these medication at discharge    Continue these Outpatient Medications:  -  OFloxacin  0.3% otic solution -5 drops BID  Debrox 0.65%otic soultion 3 drips BID PRN cerumen             Psychiatric Examination:   BP (!) 128/90 (BP Location: Left arm)   Pulse 126   Temp 98  F (36.7  C) (Oral)   Resp 16   SpO2 100%     Appearance:  awake, alert  Psychomotor Behavior:  fidgeting and intact station, gait and muscle tone  Eye Contact:  fair  Attitude:  somewhat cooperative  Mood:   "\"good\"  Affect:  intensity is heightened  Speech: Loud volume, rambling  Language: English is primary, communicates coherently in conversational context  Thought Process:  linear, no loose associations  Thought Content:  no evidence of suicidal ideation or homicidal ideation  Insight:  limited  Judgment:  fair  Oriented to:  time, person, and place  Attention Span and Concentration:  intact  Recent and Remote Memory:  intact  Fund of Knowledge: low-normal         Discharge Plan:   Pt is laving against medical advice  Psychiatric Appointments:    PHYSICAL 4:00 PM   [facility] [address]   [address]   [facility] [address] [phone]       Medications:  PT refused all medication except his antibiotic ear drops.    Attestation:   The patient has been seen and evaluated by me,  Camilo Rose MD. I have examined the patient today and reviewed the discharge plan. I agree with the final assessment and plan, as noted in the discharge summary. I have reviewed today's vital signs, medications, labs and imaging.  Total time discharge plannin minutes  Camilo Rose MD ,Ph.D.          "

## 2021-06-18 NOTE — PLAN OF CARE
" Pt appeared to sleep 5.5 hours. Pt requested something for sleep at 2351 and was given PRN melatonin. Pt also requested tylenol for generalized back pain but it was too soon for tylenol, hot pack given. Pt requested and given ensure. Pt fell asleep shortly after and awoke next at 0230 for juice and returned to bed. Pt presents as restless while waiting for requests. Pt woke up at 0620 and requested tylenol for back pain, 1000mg given. Pt requested something for anxiety to stop feeling like \"crawling out of my skin\", PRN hydroxyzine given.  A second ensure given this morning.     Problem: Suicidal Behavior  Goal: Suicidal Behavior is Absent or Managed  Outcome: No Change     "

## 2021-06-18 NOTE — PLAN OF CARE
Candido slept until woken to meet with MD at 1135-allowed lab draw-less irritable-conversation more reality based-post meeting with Dr Rose requested discharge-denies SI-reviewed AVS including appt at clinic to follow up with ear infection-reviewed medication schedule-has phone numbers to seek Rule 25 and CD Tx-reminded potential hazards of methamphetamine abuse-discharged [id number]

## 2021-06-18 NOTE — PROGRESS NOTES
Brief Note - Cross Cover    S: Notified that patient was requesting medication for nightmares. Does not appear that patient has ever been diagnosed with a trauma-related disorder. Previously prescribed Seroquel although per chart review this appears to be primarily for psychosis.    O: BP (!) 128/90 (BP Location: Left arm)   Pulse 126   Temp 98  F (36.7  C) (Oral)   Resp 16   SpO2 100%     A/P:  - Will defer to primary team so further trauma and/or sleep symptoms can be explored    Kayley Phelps  PGY2 Psychiatry

## 2021-06-29 ENCOUNTER — HOSPITAL ENCOUNTER (INPATIENT)
Facility: CLINIC | Age: 31
LOS: 7 days | Discharge: HOME | End: 2021-07-06
Attending: EMERGENCY MEDICINE | Admitting: PSYCHIATRY & NEUROLOGY
Payer: COMMERCIAL

## 2021-06-29 ENCOUNTER — TELEPHONE (OUTPATIENT)
Dept: BEHAVIORAL HEALTH | Facility: CLINIC | Age: 31
End: 2021-06-29

## 2021-06-29 DIAGNOSIS — F29 PSYCHOSIS, UNSPECIFIED PSYCHOSIS TYPE (H): Primary | ICD-10-CM

## 2021-06-29 DIAGNOSIS — Z11.52 ENCOUNTER FOR SCREENING LABORATORY TESTING FOR SEVERE ACUTE RESPIRATORY SYNDROME CORONAVIRUS 2 (SARS-COV-2): ICD-10-CM

## 2021-06-29 PROCEDURE — 99284 EMERGENCY DEPT VISIT MOD MDM: CPT | Performed by: EMERGENCY MEDICINE

## 2021-06-29 PROCEDURE — 87635 SARS-COV-2 COVID-19 AMP PRB: CPT | Performed by: EMERGENCY MEDICINE

## 2021-06-29 PROCEDURE — 250N000013 HC RX MED GY IP 250 OP 250 PS 637: Performed by: EMERGENCY MEDICINE

## 2021-06-29 PROCEDURE — C9803 HOPD COVID-19 SPEC COLLECT: HCPCS | Performed by: EMERGENCY MEDICINE

## 2021-06-29 PROCEDURE — 90791 PSYCH DIAGNOSTIC EVALUATION: CPT

## 2021-06-29 PROCEDURE — 250N000013 HC RX MED GY IP 250 OP 250 PS 637: Performed by: STUDENT IN AN ORGANIZED HEALTH CARE EDUCATION/TRAINING PROGRAM

## 2021-06-29 PROCEDURE — 99285 EMERGENCY DEPT VISIT HI MDM: CPT | Mod: 25 | Performed by: EMERGENCY MEDICINE

## 2021-06-29 PROCEDURE — 124N000002 HC R&B MH UMMC

## 2021-06-29 RX ORDER — POLYETHYLENE GLYCOL 3350 17 G/17G
17 POWDER, FOR SOLUTION ORAL DAILY PRN
Status: DISCONTINUED | OUTPATIENT
Start: 2021-06-29 | End: 2021-07-06 | Stop reason: HOSPADM

## 2021-06-29 RX ORDER — DIAZEPAM 5 MG
5-20 TABLET ORAL EVERY 30 MIN PRN
Status: DISCONTINUED | OUTPATIENT
Start: 2021-06-29 | End: 2021-06-30

## 2021-06-29 RX ORDER — MAGNESIUM HYDROXIDE/ALUMINUM HYDROXICE/SIMETHICONE 120; 1200; 1200 MG/30ML; MG/30ML; MG/30ML
30 SUSPENSION ORAL EVERY 4 HOURS PRN
Status: DISCONTINUED | OUTPATIENT
Start: 2021-06-29 | End: 2021-07-06 | Stop reason: HOSPADM

## 2021-06-29 RX ORDER — OLANZAPINE 10 MG/1
10 TABLET, ORALLY DISINTEGRATING ORAL 2 TIMES DAILY
Status: DISCONTINUED | OUTPATIENT
Start: 2021-06-29 | End: 2021-06-29

## 2021-06-29 RX ORDER — LANOLIN ALCOHOL/MO/W.PET/CERES
3 CREAM (GRAM) TOPICAL
Status: DISCONTINUED | OUTPATIENT
Start: 2021-06-29 | End: 2021-07-06 | Stop reason: HOSPADM

## 2021-06-29 RX ORDER — HYDROXYZINE HYDROCHLORIDE 25 MG/1
25 TABLET, FILM COATED ORAL EVERY 4 HOURS PRN
Status: DISCONTINUED | OUTPATIENT
Start: 2021-06-29 | End: 2021-07-06 | Stop reason: HOSPADM

## 2021-06-29 RX ORDER — OLANZAPINE 10 MG/2ML
10 INJECTION, POWDER, FOR SOLUTION INTRAMUSCULAR 3 TIMES DAILY PRN
Status: DISCONTINUED | OUTPATIENT
Start: 2021-06-29 | End: 2021-06-30

## 2021-06-29 RX ORDER — OLANZAPINE 10 MG/1
10 TABLET ORAL 3 TIMES DAILY PRN
Status: DISCONTINUED | OUTPATIENT
Start: 2021-06-29 | End: 2021-06-30

## 2021-06-29 RX ORDER — OLANZAPINE 10 MG/1
10 TABLET ORAL AT BEDTIME
Status: DISCONTINUED | OUTPATIENT
Start: 2021-06-30 | End: 2021-06-30

## 2021-06-29 RX ORDER — ACETAMINOPHEN 325 MG/1
650 TABLET ORAL EVERY 4 HOURS PRN
Status: DISCONTINUED | OUTPATIENT
Start: 2021-06-29 | End: 2021-07-06 | Stop reason: HOSPADM

## 2021-06-29 RX ADMIN — DIAZEPAM 5 MG: 5 TABLET ORAL at 19:40

## 2021-06-29 RX ADMIN — HYDROXYZINE HYDROCHLORIDE 25 MG: 25 TABLET, FILM COATED ORAL at 19:40

## 2021-06-29 RX ADMIN — OLANZAPINE 10 MG: 10 TABLET, ORALLY DISINTEGRATING ORAL at 18:53

## 2021-06-29 RX ADMIN — MELATONIN TAB 3 MG 3 MG: 3 TAB at 19:39

## 2021-06-29 NOTE — TELEPHONE ENCOUNTER
S: Mo gave clinical saying pt was bib himself to [facility] ED around 12:45 am due to paranoia.  B: hx of schizophrenia and substance induced psychosis. Pt reports aud alejandra's of someone whispering in his ear telling him to kill. At one point in the interview he said was going to kill the . He arjun a plan. He says Allah is telling him to kill. Hx of hallucinations usually tied ot meth and K2 use. However, he denies recent use.he says he has visual alejandra;s but says he can not explain them.  Utox    He was last d/c'ed June 18 from [facility] psych unit. When asked about SI, pt said he is suicidal but denies plan or intent. No Covid symptoms. Covid test ordered. No hx of aggression. Pt had surgery on his mandibal in 2018 and since then has had chronic ear pain. No other chronic med prob's.   A: appears psychotic; irritable, coop with most questions, walking indee, SI but denies plan or intent, HI, med cleared, emergency admit hold  Patient cleared and ready for behavioral bed placement: Yes     R: Paged Milton at 9:55 am   Paged milton at 12:07 pm

## 2021-06-29 NOTE — ED NOTES
"Pt asked this writer to cover him up with his blankets and requested a new juice. \"I didn't open up this juice someone else opened this up\". Pt was very paranoid about what might have happened to or be in his juice.   "

## 2021-06-29 NOTE — PHARMACY-ADMISSION MEDICATION HISTORY
Admission Medication History Completed by Pharmacy    See [facility] Admission Navigator for allergy information, preferred outpatient pharmacy, prior to admission medications and immunization status.     Medication History Sources:     Patient    Recent discharge from [facility] 6/18/21    Sure Scripts    Changes made to PTA medication list (reason):    Added: None    Deleted: Debrox ear drops PRN, ofloxacin ear drips (per pt ran out of both/completed therapy)    Changed: None    Additional Information:    Patient mentioned he would like more ear drops as he ran out last week. Unsure if still needed since it appears the ofloxacin was intended to be a 5 day duration. Debrox should not be used daily chronically either.    Prior to Admission medications    Not on File       Date completed: 06/29/21    Medication history completed by: Molly Brito, ColtenD, BCPS

## 2021-06-29 NOTE — ED PROVIDER NOTES
"ED Provider Note  [facility]      History     Chief Complaint   Patient presents with     Hallucinations     \"I've been walking around and walking around and hearing voices\"     Paranoid     \"I ran away from the place the took me. I'm paranoid.\"     HPI  Candido Sahni is a 31 year old male who resents for mental health evaluation.  The circumstances are not entirely clear to me as to what happened tonight.  He is very vague at this point stating, \" the world is small to me.\" \" I was hot and then I was cold.\"  He then tells me that he needs to gather his thoughts does not want to talk to me right now.  He denies any injuries tonight.  He will not answer me when I asked him about substance use.  He denies any acute physical complaints this time and states that his body feels healthy.    Past Medical History  Past Medical History:   Diagnosis Date     Psychosis (H) 4/6/2012     History reviewed. No pertinent surgical history.  carbamide peroxide (DEBROX) 6.5 % otic solution  ofloxacin (FLOXIN) 0.3 % otic solution      No Known Allergies  Family History  No family history on file.  Social History   Social History     Tobacco Use     Smoking status: Current Every Day Smoker     Packs/day: 0.50     Smokeless tobacco: Never Used   Substance Use Topics     Alcohol use: Yes     Alcohol/week: 6.0 standard drinks     Types: 3 Cans of beer, 3 Shots of liquor per week     Drug use: No      Past medical history, past surgical history, medications, allergies, family history, and social history were reviewed with the patient. No additional pertinent items.       Review of Systems  A complete review of systems was performed with pertinent positives and negatives noted in the HPI, and all other systems negative.    Physical Exam   BP: 120/77  Pulse: 100  Temp: 96.8  F (36  C)  Resp: 16  Weight: 63.8 kg (140 lb 11.2 oz)  SpO2: 98 %  Physical Exam  Constitutional:       General: He is not in acute " distress.     Appearance: He is not diaphoretic.   HENT:      Head: Atraumatic.   Eyes:      General: No scleral icterus.  Cardiovascular:      Heart sounds: Normal heart sounds.   Pulmonary:      Effort: No respiratory distress.      Breath sounds: Normal breath sounds.   Abdominal:      Palpations: Abdomen is soft.      Tenderness: There is no abdominal tenderness.   Musculoskeletal:         General: No deformity.   Skin:     General: Skin is warm.         ED Course      Procedures               No results found for any visits on 06/29/21.  Medications - No data to display     Assessments & Plan (with Medical Decision Making)   Patient does not present any acute physical complaints this time.  He will need a mental health evaluation, perhaps some collateral information as well.  He will be signed out to the oncoming provider at shift change pending mental health assessment.    Dictation Disclaimer: Some of this Note has been completed with voice-recognition dictation software. Although errors are generally corrected real-time, there is the potential for a rare error to be present in the completed chart.    ADDENDUM: He was seen and evaluated by the . He appeared psychotic during the interview, was generally noncooperative but said that Osvaldo is his master and that he wants to kill. He threatened to kill the . He reported auditory and visual hallucinations. He will be admitted to mental health for psychosis.       I have reviewed the nursing notes. I have reviewed the findings, diagnosis, plan and need for follow up with the patient.    New Prescriptions    No medications on file       Final diagnoses:   Psychosis, unspecified psychosis type (H)       --  Ashwini Barfield  [facility] EMERGENCY DEPARTMENT  6/29/2021     Ashwini Barfield MD  06/29/21 0712       Ashwini Barfield MD  06/29/21 [id number]

## 2021-06-29 NOTE — ED NOTES
"Pt denied needing to void and declined nasal swab. Pt sat up and cooperative with RN getting vitals.  Pt asked if he needed anhything to which he stated \"juice\" RN opened and handed over the apple juice pt drank it and laid back down. Pt began speaking jibberish about \"allah, his sister, the courts, that he aint no bitch, and then stated, I am going to K.I.L.L.\" RN asked who pt is referring to, pt laid back down mumbling and covered his face with the blankets.       "

## 2021-06-29 NOTE — ED NOTES
[facility] ED Mental Health Handoff Note:       Brief HPI:  This is a 31 year old male signed out to me by Dr. Barfield.  See initial ED Provider note for full details of the presentation. Interval history is pertinent for no acute events.    Home meds reviewed and ordered/administered: Yes    Medically stable for inpatient mental health admission: Yes.    Evaluated by mental health: Yes. The recommendation is for inpatient mental health treatment. Bed search in process    Safety concerns: At the time I received sign out, there were no safety concerns.    Hold Status:  Active Orders   Legal    Health Officer Authority to Detain (LANNY)     Frequency: Effective Now     Start Date/Time: 06/29/21 1221      Number of Occurrences: Until Specified     Order Comments: This patient presented with circumstances that have led me to be reasonably suspicious that the patient is at significant risk of self-harm. The patient's judgment to this situation appears to be impaired. Given the circumstances in which the patient presented, it is likely that the patient is at significant risk of attempting self harm if this situation is not investigated further. I am highly concerned that the patient is mentally ill and currently cannot safely care for oneself. This represents endangerment to the patient's well-being and safety, and I am placing a Health Officer Authority hold upon the patient at this time.              Exam:   Patient Vitals for the past 24 hrs:   BP Temp Temp src Pulse Resp SpO2 Weight   06/29/21 1312 133/78 -- -- 79 16 100 % --   06/29/21 0053 120/77 96.8  F (36  C) Oral 100 16 98 % 63.8 kg (140 lb 11.2 oz)           ED Course:    Medications - No data to display         There were no significant events during my shift.        Impression:    ICD-10-CM    1. Psychosis, unspecified psychosis type (H)  F29        Plan:    1. Awaiting mental health evaluation/recommendations.      RESULTS:   No results found for this  visit on 06/29/21 (from the past 24 hour(s)).          MD Gil Valenzuela Steven E, MD  06/29/21 3283

## 2021-06-29 NOTE — ED NOTES
Patient received as sign-out at change of shift.  Please see initial note for complete details.  31 year old male who presented to the ED with agitation and homicidal ideations.  Awaiting inpatient mental health bed  Acute events during this shift: Admitting psychiatry team wants patient on a 72-hour hold.  He was reassessed at this time.  He continues to be agitated and expressing homicidal ideations.  72-hour hold appears appropriate and one placed.     Daniel Cali MD  06/29/21 5341

## 2021-06-29 NOTE — ED NOTES
"ED to Behavioral Floor Handoff    SITUATION  Candido Sahni is a 31 year old male who speaks English and lives home status is unknown unknown The patient arrived in the ED by walking from home with a complaint of Hallucinations (\"I've been walking around and walking around and hearing voices\") and Paranoid (\"I ran away from the place the took me. I'm paranoid.\")  .The patient's current symptoms started/worsened unknown ago and during this time the symptoms have increased.   In the ED, pt was diagnosed with   Final diagnoses:   Psychosis, unspecified psychosis type (H)        Initial vitals were: BP: 120/77  Pulse: 100  Temp: 96.8  F (36  C)  Resp: 16  Weight: 63.8 kg (140 lb 11.2 oz)  SpO2: 98 %   --------  Is the patient diabetic? No   If yes, last blood glucose? --     If yes, was this treated in the ED? --  --------  Is the patient inebriated (ETOH) No or Impaired on other substances? Unknown, pt refused urine screen  MSSA done? N/A  Last MSSA score: --    Were withdrawal symptoms treated? N/A  Does the patient have a seizure history? No. If yes, date of most recent seizure--  --------  Is the patient patient experiencing suicidal ideation? reports suicidal ideation with out intention or a suicidal plan    Homicidal ideation? reports current or recent homicidal ideation or behaviors including auditory hallucinations telling pt to kill    Self-injurious behavior/urges? denies current or recent self injurious behavior or ideation.  ------  Was pt aggressive in the ED No  Was a code called No  Is the pt now cooperative? Yes  -------  Meds given in ED:   Medications   OLANZapine zydis (zyPREXA) ODT tab 10 mg (has no administration in time range)      Family present during ED course? No  Family currently present? No    BACKGROUND  Does the patient have a cognitive impairment or developmental disability? No  Allergies: No Known Allergies.   Social demographics are   Social History     Socioeconomic History     " Marital status: Single     Spouse name: None     Number of children: None     Years of education: None     Highest education level: None   Occupational History     None   Social Needs     Financial resource strain: None     Food insecurity     Worry: None     Inability: None     Transportation needs     Medical: None     Non-medical: None   Tobacco Use     Smoking status: Current Every Day Smoker     Packs/day: 0.50     Smokeless tobacco: Never Used   Substance and Sexual Activity     Alcohol use: Yes     Alcohol/week: 6.0 standard drinks     Types: 3 Cans of beer, 3 Shots of liquor per week     Drug use: No     Sexual activity: None   Lifestyle     Physical activity     Days per week: None     Minutes per session: None     Stress: None   Relationships     Social connections     Talks on phone: None     Gets together: None     Attends [employer] service: None     Active member of club or organization: None     Attends meetings of clubs or organizations: None     Relationship status: None     Intimate partner violence     Fear of current or ex partner: None     Emotionally abused: None     Physically abused: None     Forced sexual activity: None   Other Topics Concern     None   Social History Narrative     None        ASSESSMENT  Labs results   Labs Ordered and Resulted from Time of ED Arrival Up to the Time of Departure from the ED   SARS-COV-2 (COVID-19) VIRUS RT-PCR   DRUG ABUSE SCREEN 6 CHEM DEP URINE ([facility])   DOCUMENT IN LEGAL HOLD NAVIGATOR   DOCUMENT      Imaging Studies: No results found for this or any previous visit (from the past 24 hour(s)).   Most recent vital signs /78   Pulse 79   Temp 96.8  F (36  C) (Oral)   Resp 16   Wt 63.8 kg (140 lb 11.2 oz)   SpO2 100%   BMI 18.56 kg/m     Abnormal labs/tests/findings requiring intervention:---   Pain control: pt had none  Nausea control: pt had none    RECOMMENDATION  Are any infection precautions needed (MRSA, VRE, etc.)? No If yes, what infection?  --  ---  Does the patient have mobility issues? independently. If yes, what device does the pt use? ---  ---  Is patient on 72 hour hold or commitment? Yes If on 72 hour hold, have hold and rights been given to patient? Yes  Are admitting orders written if after 10 p.m. ?N/A  Tasks needing to be completed:---     Mazin Carrillo RN     [phone] [facility]   [phone] [facility]

## 2021-06-29 NOTE — H&P
"Psychiatry History and Physical    Candido Sahni MRN# [id number]   Age: 31 year old YOB: 1990     Date of Admission:  6/29/2021  Admitting Physician:   MI Rose.          Contacts:     Primary Outpatient Psychiatrist: none   Primary Physician: Jesus Reddy  Therapist: none  [facility] : none  Family Members: Isela (sister) [phone]         Chief Complaint:     \"Let me go to sleep\"         History of Present Illness:     History obtained from patient and electronic chart    Candido Sahni is a 31 year old male previously diagnosed with substance-induced psychosis and with a history of polysubstance use (K2, amphetamines) admitted from the [facility] ED on a 72HH on 6/29/21 due to concern for CAH telling him to harm himself in the context  of possible substance use and a recent discharge from [facility] (6/15/21).    Per ED Note:   Candido Sahni is a 31 year old male who resents for mental health evaluation.  The circumstances are not entirely clear to me as to what happened tonight.  He is very vague at this point stating, \" the world is small to me.\" \" I was hot and then I was cold.\"  He then tells me that he needs to gather his thoughts does not want to talk to me right now.  He denies any injuries tonight.  He will not answer me when I asked him about substance use.  He denies any acute physical complaints this time and states that his body feels healthy.    He was medically cleared for admission to inpatient psychiatric unit.    Per DEC Assessment:   - Described as paranoid and experiencing   - Reports he ran away from a group home and is now homeless  - Stated to examiner \"I want to kill you, act normal!\" during interview  - Patient reported feeling scared and anxious as \"you people stole my identity. In the next five years I want to forget English and move [place].\"    Per patient report:    Candido Sahni was interviewed in the ED. Upon approach he was observed " "lying on his mattress with a blanket over his head. He stated that he \"didn't want to talk to anyone\" and was largely uncooperative in the interview.    He denied having hallucinations and reported \"those people deserve it. He denied any recent substance use including alcohol or benzodiazepines. He indicated he understood he was on a hold and stated \"don't put me in with those crazy people.\"    He requested a medication to help him \"calm down.\" Discussed starting Zyprexa to help with agitation and sleep, he expressed ambivalence towards starting this. Interview was terminated when patient stated \"go away\" and pulled the blanket back over his head.    The risks, benefits, alternatives and side effects have been discussed and are understood by the patient and other caregivers.         Psychiatric Review of Systems:     Patient unable/unwilling to participate in full interview    Psychosis:  denied hallucinations         Medical Review of Systems:     Patient unable/unwilling to participate in full interview         Psychiatric History:     Prior diagnoses: previous psychiatric diagnoses include substance-induced psychosis  Hospitalizations: multiple ED visits, hospitalized January 2021 for substance-induced psychosis  Court Committments: None per chart review  Suicide attempts: None per chart review  Self-injurious behavior: None per chart review  Violence: hx of being physically aggressive towards other, including family in past per chart  ECT: None per chart review  TMS: None per chart review  Past medications: Zyprexa         Substance Use History:     History of polysubstance abuse with evidence in chart going back several years, Utox during previous admission positive for amphetamine and cannabinoids. Reported alcohol and K2 use in the ED.     Prior Chemical Dependency treatment: hx of CD treatment at [facility] around 2018         Social History:     From [facility], has been homeless since father passed away " a few years ago, [place] [facility] [place], in past has stayed with various family members in the area.  Currently homeless. History of legal issues, details unknown. Unemployed, single, no known children.          Past Medical History:   No known history of head trauma or seizures    Past Medical History:   Diagnosis Date     Psychosis (H) 4/6/2012            Allergies:    No Known Allergies       Medications:   The patient was not taking any medications prior to admission       Family History:   Psychiatric Family Hx: unknown         Psychiatric Examination:   /78   Pulse 79   Temp 96.8  F (36  C) (Oral)   Resp 16   Wt 63.8 kg (140 lb 11.2 oz)   SpO2 100%   BMI 18.56 kg/m      Appearance: lying in bed with a blanket over his head, did not appear to be in any acute distress  Attitude:  uncooperative  Eye Contact: limited  Mood:  agitated  Affect: intensity heightened, labile  Speech:  clear, coherent  Psychomotor Behavior:  no evidence of tardive dyskinesia, dystonia, or tics  Thought Process:  disorganized  Associations:  loosening of associations present  Thought Content:  Difficult to assess given patient's inability to participate in interview, denied hallucinations   Insight:  limited  Judgment:  poor  Oriented to:  person, place  Attention Span and Concentration:  distractible  Recent and Remote Memory:  Limited by psychosis  Language:  english with appropriate syntax and vocabulary  Fund of Knowledge: appropriate  Muscle Strength and Tone: grossly normal  Gait and Station: grossly normal         Physical Exam:     See ED assessment note by ED physician on 6/29/21          Labs:   No results found for this or any previous visit (from the past 24 hour(s)).        Assessment   Candido Sahni is a 31 year old male previously diagnosed with substance-induced psychosis and with a history of polysubstance use (K2, amphetamines) admitted from the [facility] ED on a 72HH on 6/29/21 due to concern  for [facility] telling him to harm himself in the context  of possible substance use and a recent discharge from [facility] (6/15/21). He has had multiple inpatient admissions with a similar presentation. During his last admission he was advised to start neuroleptic medications however he declined. Significant symptoms include irritable, psychosis and disorganization.  The MSE on admission is notable for an irritable patient who denied auditory hallucinations but was otherwise uncooperative with the interview.  His symptoms are consistent with his historic diagnosis of substance-induced psychosis.  It is unclear if substance use is contributing to his picture, he denied any substance use on admission however he also declined a UDS. Given his history of substance-induced psychosis this remains the most likely diagnosis.     Prediposing factors for the patient's presentation are unknown. Perpetuating factors include housing instability, substance use, and maladaptive coping skills. Precipitating factors for the patient's presentation are unknown. Patient has protective factors in the form of resiliency.     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 Milton M.D.    Medications:   Outpatient medications held:     - none    Outpatient medications continued:   - none    New medications initiated:   - Olanzapine 10mg qPM for psychosis  -Hydroxyzine 25-50 mg Q4H PRN for anxiety  -Olanzapine 10 mg PO/IM prn Q2H severe agitation/psychosis    # H/o polysubstance use  - [facility] protocol  - COWS scoring    Medications: risks/benefits discussed with patient    Patient will be treated in therapeutic milieu with appropriate individual and group therapies.    Laboratory/Imaging:  - CMP, CBC, TSH pending   - UDS not collected    Legal Status: 72HH    Safety Assessment:     - Status 15      Consults:  - none    Medical diagnoses to be addressed this admission:     Dispo: Given that he currently has HI and psychosis, patient warrants inpatient psychiatric hospitalization to maintain his safety. Disposition pending clinical stabilization, medication optimization and development of an appropriate discharge plan.     Patient will be staffed with the attending physician in the AM.  ----------------------------------------------------------------------------------------------------------------  Kayley Phelps MD  PGY2 Psychiatry Resident    Attestation:  For attending attestation statement, see progress note dated June 30, 2021. Camilo Rose MD

## 2021-06-29 NOTE — PROGRESS NOTES
"Candido Sahni  June 29, 2021    Patient presents to the ED with auditory hallucinations, psychosis stating \"Allah is my support and he wants me to kill.\" Patient reports he is suicidal without a plan or intent.      Current Suicidal Ideation/Self-Injurious Concerns/Methods: None - N/A    Inappropriate Sexual Behavior: No    Aggression/Homicidal Ideation: Agitation/Hyperactivity States he \"wants to kill\" denies specific person or plan.      For additional details see full DEC assessment.       Emil Loaiza, [facility] Center[facility]THONY MSW  6/29/2021  8:30 AM        "

## 2021-06-30 LAB
ALBUMIN SERPL-MCNC: 3.3 G/DL (ref 3.4–5)
ALP SERPL-CCNC: 91 U/L (ref 40–150)
ALT SERPL W P-5'-P-CCNC: 22 U/L (ref 0–70)
ANION GAP SERPL CALCULATED.3IONS-SCNC: 5 MMOL/L (ref 3–14)
AST SERPL W P-5'-P-CCNC: 13 U/L (ref 0–45)
BASOPHILS # BLD AUTO: 0 10E9/L (ref 0–0.2)
BASOPHILS NFR BLD AUTO: 0.5 %
BILIRUB SERPL-MCNC: 0.8 MG/DL (ref 0.2–1.3)
BUN SERPL-MCNC: 14 MG/DL (ref 7–30)
CALCIUM SERPL-MCNC: 8.6 MG/DL (ref 8.5–10.1)
CHLORIDE SERPL-SCNC: 108 MMOL/L (ref 94–109)
CO2 SERPL-SCNC: 25 MMOL/L (ref 20–32)
CREAT SERPL-MCNC: 0.69 MG/DL (ref 0.66–1.25)
DIFFERENTIAL METHOD BLD: NORMAL
EOSINOPHIL # BLD AUTO: 0.3 10E9/L (ref 0–0.7)
EOSINOPHIL NFR BLD AUTO: 3.4 %
ERYTHROCYTE [DISTWIDTH] IN BLOOD BY AUTOMATED COUNT: 13.4 % (ref 10–15)
GFR SERPL CREATININE-BSD FRML MDRD: >90 ML/MIN/{1.73_M2}
GLUCOSE SERPL-MCNC: 152 MG/DL (ref 70–99)
HCT VFR BLD AUTO: 45.4 % (ref 40–53)
HGB BLD-MCNC: 14.3 G/DL (ref 13.3–17.7)
IMM GRANULOCYTES # BLD: 0 10E9/L (ref 0–0.4)
IMM GRANULOCYTES NFR BLD: 0.3 %
LYMPHOCYTES # BLD AUTO: 2.8 10E9/L (ref 0.8–5.3)
LYMPHOCYTES NFR BLD AUTO: 36 %
MCH RBC QN AUTO: 28 PG (ref 26.5–33)
MCHC RBC AUTO-ENTMCNC: 31.5 G/DL (ref 31.5–36.5)
MCV RBC AUTO: 89 FL (ref 78–100)
MONOCYTES # BLD AUTO: 0.5 10E9/L (ref 0–1.3)
MONOCYTES NFR BLD AUTO: 6.5 %
NEUTROPHILS # BLD AUTO: 4.1 10E9/L (ref 1.6–8.3)
NEUTROPHILS NFR BLD AUTO: 53.3 %
NRBC # BLD AUTO: 0 10*3/UL
NRBC BLD AUTO-RTO: 0 /100
PLATELET # BLD AUTO: 352 10E9/L (ref 150–450)
POTASSIUM SERPL-SCNC: 4.1 MMOL/L (ref 3.4–5.3)
PROT SERPL-MCNC: 6.8 G/DL (ref 6.8–8.8)
RBC # BLD AUTO: 5.1 10E12/L (ref 4.4–5.9)
SODIUM SERPL-SCNC: 138 MMOL/L (ref 133–144)
TSH SERPL DL<=0.005 MIU/L-ACNC: 0.73 MU/L (ref 0.4–4)
WBC # BLD AUTO: 7.8 10E9/L (ref 4–11)

## 2021-06-30 PROCEDURE — 36415 COLL VENOUS BLD VENIPUNCTURE: CPT | Performed by: STUDENT IN AN ORGANIZED HEALTH CARE EDUCATION/TRAINING PROGRAM

## 2021-06-30 PROCEDURE — 85025 COMPLETE CBC W/AUTO DIFF WBC: CPT | Performed by: STUDENT IN AN ORGANIZED HEALTH CARE EDUCATION/TRAINING PROGRAM

## 2021-06-30 PROCEDURE — 93010 ELECTROCARDIOGRAM REPORT: CPT | Performed by: INTERNAL MEDICINE

## 2021-06-30 PROCEDURE — 93005 ELECTROCARDIOGRAM TRACING: CPT

## 2021-06-30 PROCEDURE — 80053 COMPREHEN METABOLIC PANEL: CPT | Performed by: STUDENT IN AN ORGANIZED HEALTH CARE EDUCATION/TRAINING PROGRAM

## 2021-06-30 PROCEDURE — 124N000002 HC R&B MH UMMC

## 2021-06-30 PROCEDURE — 99223 1ST HOSP IP/OBS HIGH 75: CPT | Mod: AI | Performed by: PSYCHIATRY & NEUROLOGY

## 2021-06-30 PROCEDURE — 84443 ASSAY THYROID STIM HORMONE: CPT | Performed by: STUDENT IN AN ORGANIZED HEALTH CARE EDUCATION/TRAINING PROGRAM

## 2021-06-30 PROCEDURE — 250N000013 HC RX MED GY IP 250 OP 250 PS 637: Performed by: STUDENT IN AN ORGANIZED HEALTH CARE EDUCATION/TRAINING PROGRAM

## 2021-06-30 PROCEDURE — 250N000013 HC RX MED GY IP 250 OP 250 PS 637

## 2021-06-30 RX ORDER — OLANZAPINE 20 MG/1
20 TABLET, ORALLY DISINTEGRATING ORAL AT BEDTIME
Status: DISCONTINUED | OUTPATIENT
Start: 2021-06-30 | End: 2021-07-06 | Stop reason: HOSPADM

## 2021-06-30 RX ORDER — OLANZAPINE 10 MG/2ML
10 INJECTION, POWDER, FOR SOLUTION INTRAMUSCULAR 2 TIMES DAILY PRN
Status: DISCONTINUED | OUTPATIENT
Start: 2021-06-30 | End: 2021-07-06 | Stop reason: HOSPADM

## 2021-06-30 RX ORDER — DIAZEPAM 5 MG
10 TABLET ORAL DAILY PRN
Status: DISCONTINUED | OUTPATIENT
Start: 2021-06-30 | End: 2021-07-02

## 2021-06-30 RX ORDER — OLANZAPINE 10 MG/1
10 TABLET, ORALLY DISINTEGRATING ORAL 2 TIMES DAILY PRN
Status: DISCONTINUED | OUTPATIENT
Start: 2021-06-30 | End: 2021-07-06 | Stop reason: HOSPADM

## 2021-06-30 RX ADMIN — OLANZAPINE 20 MG: 20 TABLET, ORALLY DISINTEGRATING ORAL at 21:39

## 2021-06-30 RX ADMIN — HYDROXYZINE HYDROCHLORIDE 25 MG: 25 TABLET, FILM COATED ORAL at 15:48

## 2021-06-30 RX ADMIN — DIAZEPAM 10 MG: 5 TABLET ORAL at 08:19

## 2021-06-30 RX ADMIN — OLANZAPINE 10 MG: 10 TABLET, FILM COATED ORAL at 07:55

## 2021-06-30 RX ADMIN — ACETAMINOPHEN 650 MG: 325 TABLET, FILM COATED ORAL at 18:11

## 2021-06-30 RX ADMIN — MELATONIN TAB 3 MG 3 MG: 3 TAB at 21:39

## 2021-06-30 NOTE — PLAN OF CARE
Problem: General Plan of Care (Inpatient Behavioral)  Goal: Team Discussion  Description: Team Plan:  Outcome: No Change  Note: BEHAVIORAL TEAM DISCUSSION    Participants: Doctor Rose; Bri Gonzalez RN; Fifi Lainez [facility]  Progress: Initial assessment  Anticipated length of stay: 5-7 days  Continued Stay Criteria/Rationale: Admitted on 72 hour hold due to psychosis including command hallucinations to harm self and others in the context of substance abuse.   Medical/Physical: No acute issues  Precautions:   Behavioral Orders   Procedures    Code 1 - Restrict to Unit    Discontinue 1:1 attendant for suicide risk     Order Specific Question:   I have performed an in person assessment of the patient     Answer:   Based on this assessment the patient no longer requires a one on one attendant at this point in time.     Order Specific Question:   Rationale     Answer:   Patient States able to remain safe in hospital    Elopement precautions    Routine Programming     As clinically indicated    Status 15     Every 15 minutes.    Withdrawal precautions     Plan: Psychiatric Assessment. Medication Management. Therapeutic Mileu. Individual and group support.   Rationale for change in precautions or plan: No change.

## 2021-06-30 NOTE — PLAN OF CARE
Problem: Psychotic Symptoms  Goal: Psychotic Symptoms  Description: Signs and symptoms of listed problems will be absent or manageable.  Outcome: No Change  Flowsheets (Taken 6/30/2021 3974)  Psychotic Symptoms Assessed: all  Psychotic Symptoms Present:    mood    affect    thought process    insight    anxiety     Merlinmarke very agitated this AM-angrily demanding RN stop sister from talking to him-unclear if he is hearing voices or had recent interaction with sister-angrily accused RN of purposely acting like she didn't know what was happening when RN tried to clarify what he was saying-appeared to be demanding immediate relief from active stressor-accepted Zyprexa 10 mg PO at 0755 with minimal relief-At 0818 scored MSSA 15 due to agitation and elevated pulse-also declined bkft, drinking only Ensure for bkft-medicated with Valium 10 mg PO-COWS=4-continued agitated for short period of time-returned to sleep and has continued sleeping rest of day with exception of seeing team and awake for EKG

## 2021-06-30 NOTE — PROGRESS NOTES
06/29/21 1924   Patient Belongings   Did you bring any home meds/supplements to the hospital?  No   Patient Belongings locker   Patient Belongings Put in Hospital Secure Location (Security or Locker, etc.) clothing;shoes;other (see comments)   Belongings Search Yes   Clothing Search Yes   Second Staff Jeff TRISTANFrancis       Patient Locker:    1 - Pair of shoes  1 - Dress shirt  2 - Slacks  2 - Belts  1 - White T-Shirt    Sent to Security: Envelop # [id number]    5 - Gift Cards  1 - MC Debit (...0856)  1 - Visa Debit (...0849)  1 - Bus Card  A               Admission:  I am responsible for any personal items that are not sent to the safe or pharmacy.  [facility] is not responsible for loss, theft or damage of any property in my possession.    Signature:  _________________________________ Date: _______  Time: _____                                              Staff Signature:  ____________________________ Date: ________  Time: _____      2nd Staff person, if patient is unable/unwilling to sign:    Signature: ________________________________ Date: ________  Time: _____     Discharge:  [facility] has returned all of my personal belongings:    Signature: _________________________________ Date: ________  Time: _____                                          Staff Signature:  ____________________________ Date: ________  Time: _____

## 2021-06-30 NOTE — PLAN OF CARE
Pt appeared to sleep 6.25 hours. Pt sleeping at beginning of shift with unlabored regular respirations. Pt easy to arouse @0018 to complete MSSA score of 5 and COWS score 1. No PRN indicated or requested. Pt requested ensure and resumed sleeping.   @0411 MSSA score 4, and COWS score 1. No PRN indicated or requested. Pt requested second ensure and resumed sleeping.     Problem: Sleep Disturbance  Goal: Adequate Sleep/Rest  Outcome: Improving

## 2021-06-30 NOTE — PROGRESS NOTES
"  ----------------------------------------------------------------------------------------------------------  [facility], [place]   Psychiatric Progress Note  Hospital Day #1     Interim History:   The patient's care was discussed with the treatment team and chart notes were reviewed.    Sleep 6.75 hours (06/30/21 0600)  Scheduled Medications: compliant with scheduled Olanzapine last night.   PRN medications: yesterday at 19:40 he required Diazepam as part of MSSA protocol, He required Hydroxyzine 25mg po at 19:40, He required Melatonin 3mg at 19:39    Staff Report:   Pt appeared to sleep 6.25 hours. Pt sleeping at beginning of shift with unlabored regular respirations. Pt easy to arouse @0018 to complete MSSA score of 5 and COWS score 1. No PRN indicated or requested. Pt requested ensure and resumed sleeping.   @0411 MSSA score 4, and COWS score 1. No PRN indicated or requested. Pt requested second ensure and resumed sleeping.        Patient Interview:   Pt. Interviewed in his room. Patient laying on his bed, appears tired, somnolent and keeping his eyes closed throughout the interview.  When asked about his thoughts on current hospitalization he says he came in because of  \"hearing voices telling me to kill people\"  He currently denies having SI/HI. He also mentions he feels safe now. Patient admits to substance use \"K2-only\" Denies other substances. When asked about considering treatment for chemical dependency he says \"I'll think about it\". Medication options are discussed and he agrees to Zyprexa, dissolving formula preferred. On safety measures he denies current homicidal ideations or commendatory auditory hallucinations. Patient agrees to be more cooperative.     The risks, benefits, alternatives and side effects of any medication changes have been discussed and are understood by the patient and other caregivers.    Review of systems:     ROS was negative unless noted above.       " "   Allergies:   No Known Allergies         Psychiatric Examination:   /81   Pulse 77   Temp 98.1  F (36.7  C) (Oral)   Resp 16   Wt 60.6 kg (133 lb 8 oz)   SpO2 99%   BMI 17.61 kg/m    Weight is 133 lbs 8 oz  Body mass index is 17.61 kg/m .    MENTAL STATUS EXAM    Appearance:  disheveled  Attitude:  guarded  Psychomotor:  no evidence of tics, dystonia, or tardive dyskinesia  Eye Contact: avoids or evasive  Speech:  decreased rate and reticent  Mood: \"ok\"  Affect:  restricted  Thought Content: preoccupations  Thought Process: tangential  Sensorium: sedated  Cognition: memory grossly intact  Impulse control: fair  Insight: poor  Judgment: poor         Labs:     Results for orders placed or performed during the hospital encounter of 06/29/21 (from the past 24 hour(s))   Asymptomatic SARS-CoV-2 COVID-19 Virus (Coronavirus) by PCR    Specimen: Nasopharyngeal   Result Value Ref Range    SARS-CoV-2 Virus Specimen Source Nasopharyngeal     SARS-CoV-2 PCR Result NEGATIVE     SARS-CoV-2 PCR Comment (Note)         Assessment      Diagnostic Impression:   Candido Sahni is a 31 year old male previously diagnosed with substance-induced psychosis and with a history of polysubstance use (K2, amphetamines) admitted from the [facility] ED on a 72HH on 6/29/21 due to concern for CAH telling him to harm himself in the context  of possible substance use and a recent discharge from [facility] (6/15/21). He has had multiple inpatient admissions with a similar presentation. During his last admission he was advised to start neuroleptic medications however he declined. Significant symptoms include irritable, psychosis and disorganization.  The MSE on admission is notable for an irritable patient who denied auditory hallucinations but was otherwise uncooperative with the interview.  His symptoms are consistent with his historic diagnosis of substance-induced psychosis.  It is unclear if substance use is contributing to his picture, he " denied any substance use on admission however he also declined a UDS. Given his history of substance-induced psychosis this remains the most likely diagnosis.      Prediposing factors for the patient's presentation are unknown. Perpetuating factors include housing instability, substance use, and maladaptive coping skills. Precipitating factors for the patient's presentation are unknown. Patient has protective factors in the form of resiliency.       Principal Diagnosis:   # Unspecified psychosis, likely substance-induced psychosis  - Olanzapine 20mg oral dissintegrating formula  qPM for psychosis  -Hydroxyzine 25-50 mg Q4H PRN for anxiety  -Olanzapine 10 mg PO/IM prn Q2H severe agitation/psychosis    H/o polysubstance use  - MSSA protocol, Discontinued by Dr. Rose  - KYLE scoring       Psychiatric Hospital course:   Patient placed on 72 hours hold on 06/29. First day of hospitalization seems to minimally improved on previous medications therefore it was decided to increase his Olanzapine dose and make olanzapine   PRN available as well. Patient agreed with medication changes.  MSSA protocol was discontinued because it was considered no longer necessary due to low risk of withdrawal.     Candido Sahni was admitted to [facility] as a voluntary patient/ on a 72 hour hold.     Discontinued Medications (& Rationale):  -Olanzapine 10 mg Increase in dose       Medical course   Labs: normal results.     Data:   Utox pending, sample has not been provided by patient         Consults:   none    Plan     Today's Changes:  - Zyprexa dose increase and formulation change to oral disintegrating.  - Zyprexa PRN available. Not to exceed total dose of 40mg in 24 hours.  -Diazepam PRN added.  - MSSA protocol discontinued.  -COWS protocol discontinued.    Psychotropic Medications:  Scheduled Psych Medications:  - Olanzapine ODT, 20 mg PO at bed time.    PRN Psych Medications  - Hydroxyzine 25 mg Q4H PRN  - Olanzapine 10 mg PO/IM  prn TID severe agitation/psychosis. Maximum dose of 40 mg in 24 hours.  - Melatonin 3mg po at bedtime PRN      Patient will be treated in therapeutic milieu with appropriate individual and group therapies as described.    Continue to offer chemical dependency treatment options.    Medical diagnoses to be addressed this admission:        Legal Status:   Orders Placed This Encounter      Emergency Hospitalization Hold (72 Hr Hold)      Safety Assessment:   Behavioral Orders   Procedures     Code 1 - Restrict to Unit     Discontinue 1:1 attendant for suicide risk     Order Specific Question:   I have performed an in person assessment of the patient     Answer:   Based on this assessment the patient no longer requires a one on one attendant at this point in time.     Order Specific Question:   Rationale     Answer:   Patient States able to remain safe in hospital     Elopement precautions     Routine Programming     As clinically indicated     Status 15     Every 15 minutes.     Withdrawal precautions       Disposition:    Pending stabilization & development of a safe discharge plan.     The patient was seen and the plan was discussed with the attending physician.     This patient was seen and discussed with my attending physician.  Mazin Garcia MD  Psychiatry PGY1     Attestation:  I, Camilo Rose MD, have personally performed an examination of this patient and I have reviewed the resident's documentation.  I have edited the note to reflect all relevant changes.  I have discussed this patient with the house staff on 6/30/2021.  I agree with resident findings and plan in today's note and yesterdays resident H&P.  I have reviewed all vitals and laboratory findings.      I certifiy that the inpatient services were ordered in accordance with the Medicare regulations governing the order. This includes certification that hospital inpatient services are reasonable and necessary and in the case of services not specified as  inpatient-only under 42 .22(n), that they are appropriately provided as inpatient services in accordance with the 2-midnight benchmark under 42 .3(e).     The reason for inpatient status is Acute Psychosis.    Camilo Rose M.D.,Ph.D.

## 2021-06-30 NOTE — PLAN OF CARE
Initial Psychosocial Assessment    I have reviewed the chart, met with the patient, and developed Care Plan.  Information for assessment was obtained from: pt and chart review    Presenting Problem:  Pt admitted on 72 hour hold (exp 7/2 @1828) due to symptoms of psychosis including agitation, paranoia and threats to hurt himself and others due to command hallucinations in the context of substance use. Medical record indicates use of meth, K2, and alcohol.     History of Mental Health and Chemical Dependency:  Prior diagnoses: previous psychiatric diagnoses include substance-induced psychosis  Hospitalizations: multiple ED visits, hospitalized January 2021 for substance-induced psychosis  Court Committments: None per chart review  Suicide attempts: None per chart review  Self-injurious behavior: None per chart review  Violence: hx of being physically aggressive towards other, including family in past per chart  ECT: None per chart review  TMS: None per chart review  Past medications: Zyprexa    History of polysubstance abuse with evidence in chart going back several years, Utox during previous admission positive for amphetamine and cannabinoids. Reported alcohol and K2 use in the ED. Hx of CD treatment at [facility] around 2018    Family Description (Constellation, Family Psychiatric History):  From [facility], has been homeless since father passed away a few years ago, has previously stayed [place] [facility] [place], in past has stayed with various family members in the area.  Currently homeless. History of legal issues, details unknown. Unemployed, single, no known children.     Significant Life Events (Illness, Abuse, Trauma, Death):  Not able to assess.     Living Situation:  Homeless    Educational Background:  Not able to assess.    Occupational History:  Unemployed.     Financial Status:  Unstable    Legal Issues:  None reported.     Ethnic/Cultural Issues:  [place]au[place] and Anabaptism    Spiritual  Orientation:  Congregation     Service History:  None reported.     Social Functioning (organization, interests):  Sister Isela [phone]    Current Treatment Providers are:  Primary Outpatient Psychiatrist: none   Primary Physician: Jesus Reddy  Therapist: none  [facility] : none    Social Service Assessment/Plan:  Patient has been admitted for psychiatric stabilization due to concern for auditory hallucinations telling him to harm himself in the context of possible substance use. Patient will have psychiatric assessment and medication management by the psychiatrist. Medications will be reviewed and adjusted per MD as indicated. The treatment team will continue to assess and stabilize the patient's mental health symptoms with the use of medications and therapeutic programming. Hospital staff will provide a safe environment and a therapeutic milieu. Staff will continue to assess patient as needed. Patient will be encouraged to participate in unit groups and activities. Patient will receive individual and group support on the unit.  CTC will do individual inpatient treatment planning and after care planning. CTC will discuss options for increasing community supports with the patient. CTC will coordinate with outpatient providers and will place referrals to ensure appropriate follow up care is in place.

## 2021-06-30 NOTE — PROGRESS NOTES
"Pt apread irritated when approaching this writer he said, \"everyone trying to get in my head and make decisions for me\". Pt slept most of the evening shift. No other concern was noted.       "

## 2021-07-01 LAB — INTERPRETATION ECG - MUSE: NORMAL

## 2021-07-01 PROCEDURE — 99232 SBSQ HOSP IP/OBS MODERATE 35: CPT | Mod: GC | Performed by: PSYCHIATRY & NEUROLOGY

## 2021-07-01 PROCEDURE — 124N000002 HC R&B MH UMMC

## 2021-07-01 PROCEDURE — 250N000013 HC RX MED GY IP 250 OP 250 PS 637

## 2021-07-01 PROCEDURE — 250N000013 HC RX MED GY IP 250 OP 250 PS 637: Performed by: STUDENT IN AN ORGANIZED HEALTH CARE EDUCATION/TRAINING PROGRAM

## 2021-07-01 RX ORDER — POLYETHYLENE GLYCOL 3350 17 G/17G
17 POWDER, FOR SOLUTION ORAL DAILY
Status: DISCONTINUED | OUTPATIENT
Start: 2021-07-01 | End: 2021-07-06 | Stop reason: HOSPADM

## 2021-07-01 RX ORDER — OFLOXACIN 3 MG/ML
5 SOLUTION AURICULAR (OTIC) DAILY
Status: DISCONTINUED | OUTPATIENT
Start: 2021-07-01 | End: 2021-07-01

## 2021-07-01 RX ORDER — OFLOXACIN 3 MG/ML
5 SOLUTION AURICULAR (OTIC) 2 TIMES DAILY
Status: DISCONTINUED | OUTPATIENT
Start: 2021-07-01 | End: 2021-07-01

## 2021-07-01 RX ORDER — FLUORIDE TOOTHPASTE
5 TOOTHPASTE DENTAL 4 TIMES DAILY PRN
Status: DISCONTINUED | OUTPATIENT
Start: 2021-07-01 | End: 2021-07-06 | Stop reason: HOSPADM

## 2021-07-01 RX ADMIN — OLANZAPINE 20 MG: 20 TABLET, ORALLY DISINTEGRATING ORAL at 23:09

## 2021-07-01 RX ADMIN — OLANZAPINE 10 MG: 10 TABLET, ORALLY DISINTEGRATING ORAL at 12:56

## 2021-07-01 RX ADMIN — HYDROXYZINE HYDROCHLORIDE 25 MG: 25 TABLET, FILM COATED ORAL at 17:53

## 2021-07-01 RX ADMIN — POLYETHYLENE GLYCOL 3350 17 G: 17 POWDER, FOR SOLUTION ORAL at 12:56

## 2021-07-01 RX ADMIN — CARBAMIDE PEROXIDE 6.5% 2 DROP: 6.5 LIQUID AURICULAR (OTIC) at 23:09

## 2021-07-01 RX ADMIN — OLANZAPINE 10 MG: 10 TABLET, ORALLY DISINTEGRATING ORAL at 18:45

## 2021-07-01 RX ADMIN — ACETAMINOPHEN 650 MG: 325 TABLET, FILM COATED ORAL at 17:54

## 2021-07-01 ASSESSMENT — ACTIVITIES OF DAILY LIVING (ADL)
HYGIENE/GROOMING: INDEPENDENT
LAUNDRY: WITH SUPERVISION
ORAL_HYGIENE: INDEPENDENT
DRESS: INDEPENDENT

## 2021-07-01 NOTE — PLAN OF CARE
"  Problem: Adult Inpatient Plan of Care  Goal: Plan of Care Review  Outcome: Improving  Flowsheets (Taken 7/1/2021 4252)  Plan of Care Reviewed With: patient  Progress: improving     Problem: Psychotic Symptoms  Goal: Psychotic Symptoms  Description: Signs and symptoms of listed problems will be absent or manageable.  Outcome: Improving  Flowsheets (Taken 7/1/2021 6742)  Psychotic Symptoms Assessed: all  Psychotic Symptoms Present:   anxiety   mood   affect   speech   thought process   insight     Candido, except when woken by MDs, slept until woken for lunch-ate lunch, accepted Miralax in orange juice-1255 requested Zyprexa 10 mg PO PRN for sxs psychosis and agitation-did ask what paper he had signed this AM-when reminded it was vol consent, commented, \"Oh, so I can leave.\"-12 Hr intent to leave process explained-Candido returned to bed  "

## 2021-07-01 NOTE — PLAN OF CARE
Problem: Sleep Disturbance  Goal: Adequate Sleep/Rest  Outcome: No Change   Observed to have slept well for approx 7 hrs overnight w/ regular non-labored respirations and no reported or observed distress.  Safe, therapeutic environment maintained.

## 2021-07-01 NOTE — PLAN OF CARE
"Assessment/Intervention/Current Symptoms and Care Coordination  - chart review  - team meeting  - team rounds/pt interview addressed patient needs/concerns  patient spoke with the team - says he is still feeling bothered by his sisters (does clarify that he has not seen his sisters in person in some time) - comments that his sisters are \"influencing staff\" with \"sorcery\"   In regards to medication he says it helped and denies any issues (see psychiatry provider note)   Attending spoke with him about options for either being in the hospital voluntarily - patient expressd desire to be here voluntarily, after first expressing this the provider did explain to him about possible process of petition for committment as hold active at time of interview. Again patient said he would like to be voluntary. He did sign the mental health consent form at end of interview.   - post team rounds team meeting / discussion    - Current Symptoms include the following: Psychosis, AH, disorganization,paranoia, delusions,  patient is irritable, and anxious     Discharge Plan or Goal  Pending stabilization & development of a safe discharge plan.  Considerations include: if patient is willing / interested - residential level of care such as co occurring [facility] program, IRTS  patient is homeless - at times has stayed with family or shelter.       Barriers to Discharge   Patient requires further psychiatric stabilization due to current symptomology      Referral Status  no referrals made currently       Legal Status  Patient is voluntary     "

## 2021-07-01 NOTE — PROGRESS NOTES
"  ----------------------------------------------------------------------------------------------------------  [facility], [place]   Psychiatric Progress Note  Hospital Day #2     Interim History:   The patient's care was discussed with the treatment team and chart notes were reviewed.    Sleep 7 hours (07/01/21 0600)  Scheduled Medications: compliant with scheduled Olanzapine last night.   PRN medications: Required Acetaminophen, Hydroxyzine, Melatonin    Staff Report:   Pt slept majority of the shift. Awake right before dinner asking for ensure and apple juice. Pt back asleep by the time dinner came. Ate dinner later.    Got prn hydroxyzine earlier for anxiety and restlessness, tylenol for generalized body ache.  Distractible thinking, poor concentration and  frustration tolerance. Asked for ensure and juice. By the time I came back from looking in the fridge, he was irritated that it took time to get them. More irritated when I had orange instead of apple juice. Easily redirected. No angry outbursts. No c/o withdrawal symptoms. No significant withdrawal symptoms observed. COWS and MSSA d/c'd.    Patient Interview:     Pt. Interviewed in his room. Patient laying on his bed, appears tired, somnolent and occasionally opening his eyes.  He is significantly more alert  Today. When asked about AH he says \"They're alright\" and continues to add \"they are multiple voices of my sisters but 2 of them bother me\". He also mentions he feels safe now. Patient admits to substance use \"K2-only\" Denies other substances. Mention he is tolerating medications but presenting dry mouth, Biotine dry mouthwash is offered. Patient complains about being \"constipated\", miralax is offered. He also requested vegetarian diet. On safety assessment he denies currentSI/ homicidal ideations or commendatory auditory hallucinations. Patient agrees to be more cooperative and work with the staff. When explained about his " "admission and 72 hour hold protocol and the need to proceed with further treatment he agrees to and signs voluntary admission.     The risks, benefits, alternatives and side effects of any medication changes have been discussed and are understood by the patient and other caregivers.    Review of systems:     ROS was negative unless noted above.          Allergies:   No Known Allergies         Psychiatric Examination:   /80   Pulse 76   Temp 97.7  F (36.5  C) (Tympanic)   Resp 14   Wt 60.6 kg (133 lb 8 oz)   SpO2 100%   BMI 17.61 kg/m    Weight is 133 lbs 8 oz  Body mass index is 17.61 kg/m .    MENTAL STATUS EXAM    Appearance:  disheveled  Attitude:  guarded  Psychomotor:  no evidence of tics, dystonia, or tardive dyskinesia  Eye Contact: avoids or evasive  Speech:  decreased rate and reticent  Mood: \"ok\"  Affect:  restricted  Thought Content: preoccupations  Thought Process: tangential  Sensorium: Somnolent   Cognition: memory grossly intact  Impulse control: fair  Insight: poor  Judgment: poor         Labs:     Results for orders placed or performed during the hospital encounter of 06/29/21 (from the past 24 hour(s))   TSH with free T4 reflex and/or T3 as indicated   Result Value Ref Range    TSH 0.73 0.40 - 4.00 mU/L   CBC with platelets differential   Result Value Ref Range    WBC 7.8 4.0 - 11.0 10e9/L    RBC Count 5.10 4.4 - 5.9 10e12/L    Hemoglobin 14.3 13.3 - 17.7 g/dL    Hematocrit 45.4 40.0 - 53.0 %    MCV 89 78 - 100 fl    MCH 28.0 26.5 - 33.0 pg    MCHC 31.5 31.5 - 36.5 g/dL    RDW 13.4 10.0 - 15.0 %    Platelet Count 352 150 - 450 10e9/L    Diff Method Automated Method     % Neutrophils 53.3 %    % Lymphocytes 36.0 %    % Monocytes 6.5 %    % Eosinophils 3.4 %    % Basophils 0.5 %    % Immature Granulocytes 0.3 %    Nucleated RBCs 0 0 /100    Absolute Neutrophil 4.1 1.6 - 8.3 10e9/L    Absolute Lymphocytes 2.8 0.8 - 5.3 10e9/L    Absolute Monocytes 0.5 0.0 - 1.3 10e9/L    Absolute " Eosinophils 0.3 0.0 - 0.7 10e9/L    Absolute Basophils 0.0 0.0 - 0.2 10e9/L    Abs Immature Granulocytes 0.0 0 - 0.4 10e9/L    Absolute Nucleated RBC 0.0    Comprehensive metabolic panel   Result Value Ref Range    Sodium 138 133 - 144 mmol/L    Potassium 4.1 3.4 - 5.3 mmol/L    Chloride 108 94 - 109 mmol/L    Carbon Dioxide 25 20 - 32 mmol/L    Anion Gap 5 3 - 14 mmol/L    Glucose 152 (H) 70 - 99 mg/dL    Urea Nitrogen 14 7 - 30 mg/dL    Creatinine 0.69 0.66 - 1.25 mg/dL    GFR Estimate >90 >60 mL/min/[1.73_m2]    GFR Estimate If Black >90 >60 mL/min/[1.73_m2]    Calcium 8.6 8.5 - 10.1 mg/dL    Bilirubin Total 0.8 0.2 - 1.3 mg/dL    Albumin 3.3 (L) 3.4 - 5.0 g/dL    Protein Total 6.8 6.8 - 8.8 g/dL    Alkaline Phosphatase 91 40 - 150 U/L    ALT 22 0 - 70 U/L    AST 13 0 - 45 U/L   EKG 12-lead, tracing only   Result Value Ref Range    Interpretation ECG Click View Image link to view waveform and result         Assessment      Diagnostic Impression:   Candido Sahni is a 31 year old male previously diagnosed with substance-induced psychosis and with a history of polysubstance use (K2, amphetamines) admitted from the [facility] ED on a 72HH on 6/29/21 due to concern for CAH telling him to harm himself in the context  of possible substance use and a recent discharge from [facility] (6/15/21). He has had multiple inpatient admissions with a similar presentation. During his last admission he was advised to start neuroleptic medications however he declined. Significant symptoms include irritable, psychosis and disorganization.  The MSE on admission is notable for an irritable patient who denied auditory hallucinations but was otherwise uncooperative with the interview.  His symptoms are consistent with his historic diagnosis of substance-induced psychosis.  It is unclear if substance use is contributing to his picture, he denied any substance use on admission however he also declined a UDS. Given his history of substance-induced  psychosis this remains the most likely diagnosis.      Prediposing factors for the patient's presentation are unknown. Perpetuating factors include housing instability, substance use, and maladaptive coping skills. Precipitating factors for the patient's presentation are unknown. Patient has protective factors in the form of resiliency.       Principal Diagnosis:   # Unspecified psychosis, likely substance-induced psychosis  - Olanzapine 20mg oral dissintegrating formula  qPM for psychosis  -Hydroxyzine 25-50 mg Q4H PRN for anxiety  -Olanzapine 10 mg PO/IM prn Q2H severe agitation/psychosis, maximum dose of 40mg in 24 hours.    H/o polysubstance use  - MSSA protocol, Discontinued  - COWS scoring discontinued       Psychiatric Hospital course:   Patient placed on 72 hours hold on 06/29. First day of hospitalization seems to minimally improved on previous medications therefore it was decided to increase his Olanzapine dose and make olanzapine   PRN available as well. Patient agreed with medication changes.  MSSA and COWS protocol were discontinued because it was considered no longer necessary due to low risk of withdrawal.     Candido Sahni was admitted to [facility] as a voluntary patient/ on a 72 hour hold. Compliant with scheduled Olanzapine 20mg PO. Agreed to voluntary admission on 7/01.    Discontinued Medications (& Rationale):  -Olanzapine 10 mg Increase in dose       Medical course   Labs: normal results.     Data:   Utox pending, sample has not been provided by patient   EKG  minimal voltage criteria for LVH, maybe normal variant. Qt/QTc: 356-395        Consults:   none    Plan     Today's Changes:  -Miralax PRN   -Biotine mouthwash PRN  Vegetarian diet    Psychotropic Medications:  Scheduled Psych Medications:  - Olanzapine ODT, 20 mg PO at bed time.    PRN Psych Medications  - Hydroxyzine 25 mg Q4H PRN  - Olanzapine 10 mg PO/IM prn TID severe agitation/psychosis. Maximum dose of 40 mg in 24 hours.  -  Melatonin 3mg po at bedtime PRN      Patient will be treated in therapeutic milieu with appropriate individual and group therapies as described.    Continue to offer chemical dependency treatment options.    Medical diagnoses to be addressed this admission:        Legal Status:   Voluntary    Safety Assessment:   Behavioral Orders   Procedures     Code 1 - Restrict to Unit     Discontinue 1:1 attendant for suicide risk     Order Specific Question:   I have performed an in person assessment of the patient     Answer:   Based on this assessment the patient no longer requires a one on one attendant at this point in time.     Order Specific Question:   Rationale     Answer:   Patient States able to remain safe in hospital     Elopement precautions     Routine Programming     As clinically indicated     Status 15     Every 15 minutes.     Withdrawal precautions       Disposition:    Pending stabilization & development of a safe discharge plan.     The patient was seen and the plan was discussed with the attending physician.     This patient was seen and discussed with my attending physician.  Mazin Garcia MD  Psychiatry PGY1     Attestation:  This patient has been seen and evaluated by me, Camilo Rose MD.  I have discussed this patient with the house staff team including the resident and medical student and I agree with the findings and plan in this note.    I have reviewed today's vital signs, medications, labs and imaging. Camilo Rose MD , PhD.

## 2021-07-01 NOTE — PLAN OF CARE
Problem: Substance Withdrawal  Intervention: Substance Withdrawal  Recent Flowsheet Documentation  Taken 6/30/2021 2000 by Estela Juarez RN  Substance Withdrawal Interventions:    encourage nutrition and hydration    maintain safety precautions    assess patient response to medication    assess medication adherance       Pt slept majority of the shift. Awake right before dinner asking for ensure and apple juice. Pt back asleep by the time dinner came. Ate dinner later.     Got prn hydroxyzine earlier for anxiety and restlessness, tylenol for generalized body ache.  Distractible thinking, poor concentration and  frustration tolerance. Asked for ensure and juice. By the time I came back from looking in the fridge, he was irritated that it took time to get them. More irritated when I had orange instead of apple juice. Easily redirected. No angry outbursts. No c/o withdrawal symptoms. No significant withdrawal symptoms observed. COWS and MSSA d/c'd.

## 2021-07-01 NOTE — PLAN OF CARE
Nursing plan of care   Problem: Psychotic Symptoms  Goal: Psychotic Symptoms  Description: Signs and symptoms of listed problems will be absent or manageable.  Outcome: Improving  Flowsheets (Taken 7/1/2021 1805)  Psychotic Symptoms Assessed:    suicidality    self injury    affect    anxiety    speech    orientation    insight    sleep  Psychotic Symptoms Present:    anxiety    affect    mood  Pt was isolative and withdrawn to his room most of the shift; presented for PRN medication C/O anxiety and generalized body ache; offered and administered 25 mg of hydroxyzine for anxiety and 650 mg of tylenol for generalized pain; on follow up, pt was noted resting in bed; declined dinner C/O pork in it, but ate the sidings and took 2 ensure came on dinner tray; denied SIB/SIB,AVH, and depression. Speech was pressured; Was untidy and disheveled; was intermittently irritable, demanded for more meddication and being impatient ,but redirectable. Medication compliant; denied medication side effect; will continue to monitor and assess.

## 2021-07-02 ENCOUNTER — TELEPHONE (OUTPATIENT)
Dept: BEHAVIORAL HEALTH | Facility: CLINIC | Age: 31
End: 2021-07-02

## 2021-07-02 PROBLEM — F22 PARANOIA (H): Status: RESOLVED | Noted: 2021-01-23 | Resolved: 2021-07-02

## 2021-07-02 PROCEDURE — 99232 SBSQ HOSP IP/OBS MODERATE 35: CPT | Performed by: PHYSICIAN ASSISTANT

## 2021-07-02 PROCEDURE — 99207 PR CONSULT E&M CHANGED TO SUBSEQUENT LEVEL: CPT | Performed by: PHYSICIAN ASSISTANT

## 2021-07-02 PROCEDURE — 250N000013 HC RX MED GY IP 250 OP 250 PS 637

## 2021-07-02 PROCEDURE — 124N000002 HC R&B MH UMMC

## 2021-07-02 PROCEDURE — 250N000013 HC RX MED GY IP 250 OP 250 PS 637: Performed by: STUDENT IN AN ORGANIZED HEALTH CARE EDUCATION/TRAINING PROGRAM

## 2021-07-02 PROCEDURE — 99232 SBSQ HOSP IP/OBS MODERATE 35: CPT | Mod: GC | Performed by: PSYCHIATRY & NEUROLOGY

## 2021-07-02 RX ORDER — DIPHENHYDRAMINE HCL 50 MG
50 CAPSULE ORAL EVERY 6 HOURS PRN
Status: DISCONTINUED | OUTPATIENT
Start: 2021-07-02 | End: 2021-07-06 | Stop reason: HOSPADM

## 2021-07-02 RX ORDER — HALOPERIDOL 5 MG/1
5 TABLET ORAL EVERY 6 HOURS PRN
Status: DISCONTINUED | OUTPATIENT
Start: 2021-07-02 | End: 2021-07-06 | Stop reason: HOSPADM

## 2021-07-02 RX ORDER — DIPHENHYDRAMINE HYDROCHLORIDE 50 MG/ML
50 INJECTION INTRAMUSCULAR; INTRAVENOUS EVERY 6 HOURS PRN
Status: DISCONTINUED | OUTPATIENT
Start: 2021-07-02 | End: 2021-07-06 | Stop reason: HOSPADM

## 2021-07-02 RX ORDER — LORAZEPAM 2 MG/1
2 TABLET ORAL EVERY 4 HOURS PRN
Status: DISCONTINUED | OUTPATIENT
Start: 2021-07-02 | End: 2021-07-02

## 2021-07-02 RX ORDER — LORAZEPAM 2 MG/1
2 TABLET ORAL EVERY 6 HOURS PRN
Status: DISCONTINUED | OUTPATIENT
Start: 2021-07-02 | End: 2021-07-06 | Stop reason: HOSPADM

## 2021-07-02 RX ORDER — LORAZEPAM 2 MG/ML
2 INJECTION INTRAMUSCULAR EVERY 6 HOURS PRN
Status: DISCONTINUED | OUTPATIENT
Start: 2021-07-02 | End: 2021-07-06 | Stop reason: HOSPADM

## 2021-07-02 RX ORDER — HALOPERIDOL 5 MG/1
5 TABLET ORAL EVERY 6 HOURS PRN
Status: DISCONTINUED | OUTPATIENT
Start: 2021-07-02 | End: 2021-07-02

## 2021-07-02 RX ORDER — HALOPERIDOL 5 MG/ML
5 INJECTION INTRAMUSCULAR EVERY 6 HOURS PRN
Status: DISCONTINUED | OUTPATIENT
Start: 2021-07-02 | End: 2021-07-06 | Stop reason: HOSPADM

## 2021-07-02 RX ADMIN — ACETAMINOPHEN 650 MG: 325 TABLET, FILM COATED ORAL at 19:36

## 2021-07-02 RX ADMIN — LORAZEPAM 2 MG: 2 TABLET ORAL at 18:29

## 2021-07-02 RX ADMIN — OLANZAPINE 10 MG: 10 TABLET, ORALLY DISINTEGRATING ORAL at 15:38

## 2021-07-02 RX ADMIN — OLANZAPINE 20 MG: 20 TABLET, ORALLY DISINTEGRATING ORAL at 19:35

## 2021-07-02 RX ADMIN — POLYETHYLENE GLYCOL 3350 17 G: 17 POWDER, FOR SOLUTION ORAL at 16:36

## 2021-07-02 RX ADMIN — HALOPERIDOL 5 MG: 5 TABLET ORAL at 18:30

## 2021-07-02 RX ADMIN — CARBAMIDE PEROXIDE 6.5% 2 DROP: 6.5 LIQUID AURICULAR (OTIC) at 16:36

## 2021-07-02 RX ADMIN — MELATONIN TAB 3 MG 3 MG: 3 TAB at 19:36

## 2021-07-02 RX ADMIN — HYDROXYZINE HYDROCHLORIDE 25 MG: 25 TABLET, FILM COATED ORAL at 15:38

## 2021-07-02 RX ADMIN — CARBAMIDE PEROXIDE 6.5% 2 DROP: 6.5 LIQUID AURICULAR (OTIC) at 19:39

## 2021-07-02 RX ADMIN — DIAZEPAM 10 MG: 5 TABLET ORAL at 10:27

## 2021-07-02 ASSESSMENT — ACTIVITIES OF DAILY LIVING (ADL)
ORAL_HYGIENE: INDEPENDENT
HYGIENE/GROOMING: INDEPENDENT
DRESS: SCRUBS (BEHAVIORAL HEALTH);INDEPENDENT

## 2021-07-02 NOTE — PLAN OF CARE
Assessment/Intervention/Current Symtoms and Care Coordination    Attended team meeting and reviewed chart notes.    The team decided to a MICD petition with Gilliam Kaushal. Pt was threatening to one of the psych associates today in front of the PA checking pt's ear pain.    Lulu from [facility] CM: [phone]      Discharge Plan or Goal  Possible IRTs placement or MICD treatment      Barriers to Discharge   Pt is in the commitment process    Referral Status  None made    Legal Status  voluntary

## 2021-07-02 NOTE — PROGRESS NOTES
Cross Cover Note    Subjective  Informed by nursing that patient is experiencing fullness in ears bilaterally and is concerned there may be insects in ears. On review of chart, Mr. Sahni was treated for suspected otitis externa during previous admission on 6/15/21. Unclear if he completed this antibiotic course prior to current admission.     Objective  /80   Pulse 76   Temp 97.7  F (36.5  C) (Tympanic)   Resp 14   Wt 60.6 kg (133 lb 8 oz)   SpO2 98%   BMI 17.61 kg/m      Assessment & Plan  Suspect patient has recurrent vs. Incompletely treated ear infection. Ekbom's syndrome/Delusional parasitosis also possible.  -Medicine consult placed for evaluation and recs on management  -Debrox otic solution initiated    Geovani Sullivan MD, PhD  PGY-2 Psychiatry Resident

## 2021-07-02 NOTE — PLAN OF CARE
Problem: Sleep Disturbance  Goal: Adequate Sleep/Rest  Outcome: No Change   Observed to have slept well for approx 7 hrs overnight w regular non-labored respirations and no reported or observed distress.  Safe, therapeutic environment maintained.

## 2021-07-02 NOTE — PROGRESS NOTES
"Pt was seen by internal medicine due to pt complaint of pain/something in his ear. Writer accompanied PA from internal [facility] to the exam room to see pt. When the PA looked in his ears and told him that they both looked good, pt stated \"Are you sure? Don't you see what's crawling around in there?\", and made further statements that he believed there was \"stuff\" and that he had seen \"insects\" fly into his ears. Pt then stated after conclusion of exam \"Get the fuck out\", puffed up his chest and advanced on the PA from internal [facility]. Writer directed pt to leave the exam room, then pt said \"Why is she lying to me\" and when writer said that she was not and had no reason to lie, he pulled back his arm as if to punch and lunged at writer. Pt did not hit writer, but fake-lunged at writer with fists raised multiple times. Writer continued to direct pt back to his room and pt was compliant; he continued to make statements accusing staff of lying to him, affect angry/tense, hyperverbal speech, making statements that something is in his ear, demanding to speak with a different doctor. Pt's room was being cleaned by [employer], and pt directed the staff out of his room saying \"Get out, I'm going crazy\". Pt stayed in his room for about 5 minutes, was approached by his nurse to take medications soon after. Pt left his room and came to the desk--pt apologized to writer, but stated \"I did that to get through to you. I'm a believer and you are not a believer, that's how it is\". Furthermore, he said \"You guys are raising a monster in my head\". Pt continued rambling to writer about how he \"will get my revenge\", but unclear what for or towards whom he would direct his \"revenge\". Pt requested to be able to see into his own ear \"with a screen, you know\" should another ear exam be done.  "

## 2021-07-02 NOTE — PROGRESS NOTES
"  ----------------------------------------------------------------------------------------------------------  [facility], [place]   Psychiatric Progress Note  Hospital Day #3     Interim History:   The patient's care was discussed with the treatment team and chart notes were reviewed.    Sleep 7 hours (07/02/21 0600)  Scheduled Medications: compliant with scheduled Olanzapine last night.   PRN medications: Required Acetaminophen, Hydroxyzine.    Staff Report:     Pt was isolative and withdrawn to his room most of the shift; presented for PRN medication C/O anxiety and generalized body ache; offered and administered 25 mg of hydroxyzine for anxiety and 650 mg of tylenol for generalized pain; on follow up, pt was noted resting in bed; declined dinner C/O pork in it, but ate the sidings and took 2 ensure came on dinner tray; denied SIB/SIB,AVH, and depression. Speech was pressured; Was untidy and disheveled; was intermittently irritable, demanded for more meddication and being impatient ,but redirectable. Medication compliant; denied medication side effect; will continue to monitor and assess.           Pt was seen by internal medicine due to pt complaint of pain/something in his ear. Writer accompanied PA from internal [facility] to the exam room to see pt. When the PA looked in his ears and told him that they both looked good, pt stated \"Are you sure? Don't you see what's crawling around in there?\", and made further statements that he believed there was \"stuff\" and that he had seen \"insects\" fly into his ears. Pt then stated after conclusion of exam \"Get the fuck out\", puffed up his chest and advanced on the PA from [facility]. Writer directed pt to leave the exam room, then pt said \"Why is she lying to me\" and when writer said that she was not and had no reason to lie, he pulled back his arm as if to punch and lunged at writer. Pt did not hit writer, but fake-lunged at writer with fists " "raised multiple times. Writer continued to direct pt back to his room and pt was compliant; he continued to make statements accusing staff of lying to him, affect angry/tense, hyperverbal speech, making statements that something is in his ear, demanding to speak with a different doctor. Pt's room was being cleaned by [employer], and pt directed the staff out of his room saying \"Get out, I'm going crazy\". Pt stayed in his room for about 5 minutes, was approached by his nurse to take medications soon after. Pt left his room and came to the desk--pt apologized to writer, but stated \"I did that to get through to you. I'm a believer and you are not a believer, that's how it is\". Furthermore, he said \"You guys are raising a monster in my head\". Pt continued rambling to writer about how he \"will get my revenge\", but unclear what for or towards whom he would direct his \"revenge\". Pt requested to be able to see into his own ear \"with a screen, you know\" should another ear exam be done.          Cross cover report:    Informed by nursing that patient is experiencing fullness in ears bilaterally and is concerned there may be insects in ears. On review of chart, Mr. Sahni was treated for suspected otitis externa during previous admission on 6/15/21. Unclear if he completed this antibiotic course prior to current admission.   Suspect patient has recurrent vs. Incompletely treated ear infection. Ekbom's syndrome/Delusional parasitosis also possible.  -Medicine consult placed for evaluation and recs on management  -Debrox otic solution initiated          Patient Interview:     Patient was interviewed in his room.   Patient has been exhibiting aggressive behavior against several staff. During interview  When asked about AH he admits \"voices are still there\". Pt attitude is very hostile and defiant saying \"If you keep me here, you are going to regret it because I'll loose it\". Pt's speech is pressured, demanding to leave.   " "When ttending physician explains that he will benefit from a longer stay in the hospital  He  becomes irritable and visible upset and threatening. After staff rounding it was decided to transfer to station 12 and proceed with 72h hold, place at 1300. Commitment process also initiated.        Review of systems:     ROS was negative unless noted above.          Allergies:   No Known Allergies         Psychiatric Examination:   /80   Pulse 76   Temp 97.7  F (36.5  C) (Tympanic)   Resp 14   Wt 60.6 kg (133 lb 8 oz)   SpO2 98%   BMI 17.61 kg/m    Weight is 133 lbs 8 oz  Body mass index is 17.61 kg/m .    MENTAL STATUS EXAM    Appearance:  disheveled  Attitude:  hostile, guarded, reactive and defiant  Psychomotor:  no evidence of tics, dystonia, or tardive dyskinesia  Eye Contact: avoids or evasive  Speech:  increased rate and pressured  Mood: \"ok\"  Affect:  labile and reactive  Thought Content: paranoid delusions, preoccupations, ruminations and obsessions  Thought Process: tangential, responsive to internal stimuli and confused  Sensorium: hypervigilant   Cognition: memory grossly intact  Impulse control: poor  Insight: poor  Judgment: poor         Labs:     No results found for this or any previous visit (from the past 24 hour(s)).     Assessment      Diagnostic Impression:   Candido Sahni is a 31 year old male previously diagnosed with substance-induced psychosis and with a history of polysubstance use (K2, amphetamines) admitted from the [facility] ED on a 72HH on 6/29/21 due to concern for CAH telling him to harm himself in the context  of possible substance use and a recent discharge from [facility] (6/15/21). He has had multiple inpatient admissions with a similar presentation. During his last admission he was advised to start neuroleptic medications however he declined. Significant symptoms include irritable, psychosis and disorganization.  The MSE on admission is notable for an irritable patient who denied " auditory hallucinations but was otherwise uncooperative with the interview.  His symptoms are consistent with his historic diagnosis of substance-induced psychosis.  It is unclear if substance use is contributing to his picture, he denied any substance use on admission however he also declined a UDS. Given his history of substance-induced psychosis this remains the most likely diagnosis.      Prediposing factors for the patient's presentation are unknown. Perpetuating factors include housing instability, substance use, and maladaptive coping skills. Precipitating factors for the patient's presentation are unknown. Patient has protective factors in the form of resiliency.       Principal Diagnosis:   # Schizoaffective Disorder    - Olanzapine 20mg oral dissintegrating formula  qPM for psychosis  -Hydroxyzine 25-50 mg Q4H PRN for anxiety  -Olanzapine 10 mg PO/IM prn Q2H severe agitation/psychosis, maximum dose of 40mg in 24 hours.    Secondary Diagnosis:  - Unspecified psychosis, likely substance-induced psychosis    Otitis  -Ofloxacin  -Medical consult     Psychiatric Hospital course:   Patient placed on 72 hours hold on 06/29. First day of hospitalization seems to minimally improved on previous medications therefore it was decided to increase his Olanzapine dose and make olanzapine   PRN available as well. Patient agreed with medication changes.  MSSA and COWS protocol were discontinued because it was considered no longer necessary due to low risk of withdrawal.     Candido Sahni was admitted to Station 22 as a voluntary patient/ on a 72 hour hold. Compliant with scheduled Olanzapine 20mg PO. Agreed to voluntary admission on 7/01. Patient became agitated and verbally aggressive toward staff, posturing and lunged toward staff with fists in the air. During staff rounding on 7/02 it was decided to transfer to station 12.    Discontinued Medications (& Rationale):  -Olanzapine 10 mg Increase in dose       Medical  course   Labs: normal results.     Data:   Utox pending, sample has not been provided by patient   EKG  minimal voltage criteria for LVH, maybe normal variant. Qt/QTc: 356-395        Consults:   Internal medicine Consult: #L ear pain, and bilateral ear fullness -  No signs of otitis externa. No obvious signs of otitis media, although a thorough exam could no be completed due to agitation and aggression of patient. Patient is afebrile and without any leukocytosis. Suspect this is likely due to underlying delusions and psychosis. Please notify medicine if patient were to develop fevers, discharge from ear, worsening pain, and if patient less agitated could re-evaluate at that time vs empiric treatment with abx if suspicion is high.  - No visualization of ear wax build up, no need for debrox   - Minimize patient ear picking   - Monitor for fevers or complaints of teeth/jaw pain  - Please ensure patient has follow-up with PCP or maxillofacial surgeon; they may need to refer him to ENT as an outpatient     Medicine will sign off. No further recommendations at this time. Please page the on-call SINGH for any intercurrent medical issues which arise.     Plan     Today's Changes:  - IM and PO PRN medications added and available.  -Patient placed back on 72 hour hold.  -Commitment process initiated.  Transfer to Station 12    Psychotropic Medications:  Scheduled Psych Medications:  - Olanzapine ODT, 20 mg PO at bed time.    PRN Psych Medications  - Hydroxyzine 25 mg Q4H PRN  - Olanzapine 10 mg PO/IM prn TID severe agitation/psychosis. Maximum dose of 40 mg in 24 hours.  - Melatonin 3mg po at bedtime PRN  -Haldol 5mg PO or IM Q6H PRN  -Ativan 2mg PO or IM       Patient will be treated in therapeutic milieu with appropriate individual and group therapies as described.    Continue to offer chemical dependency treatment options.    Medical diagnoses to be addressed this admission:    Possible otitis media. See IM Consult note and  plan.    Legal Status:   Placed on 72hr hold.  Commitment initiated.    Safety Assessment:   Behavioral Orders   Procedures     Code 1 - Restrict to Unit     Discontinue 1:1 attendant for suicide risk     Order Specific Question:   I have performed an in person assessment of the patient     Answer:   Based on this assessment the patient no longer requires a one on one attendant at this point in time.     Order Specific Question:   Rationale     Answer:   Patient States able to remain safe in hospital     Elopement precautions     Routine Programming     As clinically indicated     Status 15     Every 15 minutes.     Withdrawal precautions       Disposition:    Pending stabilization & development of a safe discharge plan.     The patient was seen and the plan was discussed with the attending physician.     This patient was seen and discussed with my attending physician.  Mazin Garcia MD  Psychiatry PGY1     Attestation:  This patient has been seen and evaluated by me, Camilo Rose MD.  I have discussed this patient with the house staff team including the resident and medical student and I agree with the findings and plan in this note.    I have reviewed today's vital signs, medications, labs and imaging. Camilo Rose MD , PhD.

## 2021-07-02 NOTE — PLAN OF CARE
"  Problem: Behavioral Health Plan of Care  Goal: Plan of Care Review  Recent Flowsheet Documentation  Taken 7/2/2021 1400 by Ching Stevenson, RN  Plan of Care Reviewed With: patient  Patient Agreement with Plan of Care: disagrees (describe)   \"  "

## 2021-07-02 NOTE — CONSULTS
Brief Internal Medicine Note     Subjective:   Candido Sahni is a 31 year old male with PMHx of psychosis and polysubstance abuse who admitted to inpatient psychiatry on 72 hour hold. Medicine consulted for concern of otitis externa/interna.     Per chart review, this has been an ongoing issue. Patient was seen in [facility] on 05/15/2021 for left ear pain as well. Appears patient complains of ear pain during frequent ED visits. Felt at that time to represent otitis externa and was given course of penicillin as well due to concern for decaying wisdom tooth and was recommended to follow-up with his maxillofacial surgeon given ongoing issues with left ear/jaw. Unclear if patient completed course of abx or used drops given paranoia and substance use. Patient was then prescribed Ofloxacin 0.4% drops on 6/16 for otitis externa.     Today patient reports feeling L>R ear pain, and feels that there is a bug in his ear. Denies any changes in his hearing.  Reports clicking in L jaw with opening mouth. Attempted to visual ears, exam below. After telling patient there were no bugs or anything in his ears, he became upset and agitated. Patient then made threatening movement towards my self and lunged at staff with fists in the air. Patient was re-directed to his room.     Objective:  /80   Pulse 76   Temp 97.7  F (36.5  C) (Tympanic)   Resp 14   Wt 60.6 kg (133 lb 8 oz)   SpO2 98%   BMI 17.61 kg/m    General: Alert, interactive, NAD.   HEENT: AT/NC. Anicteric sclera.   Ears: External ears appears normal.  L ear with normal external canal. L TM intact with +light reflex without any erythema or injection.  ?possible effusion and with attempt to re-visualize, patient became agitated and was unable to complete exam. R ear with normal canal. R TM intact with +light refex, and no erythema.   Resp: non-labored breathing on RA.   Neuro: Moving extremities symmetrically. Steady gait.     Labs/diagnostics:   ROUTINE IP LABS  (Last four results)  CMP   Recent Labs   Lab 06/30/21  0811      POTASSIUM 4.1   CHLORIDE 108   CO2 25   ANIONGAP 5   *   BUN 14   CR 0.69   PATIENCE 8.6   PROTTOTAL 6.8   ALBUMIN 3.3*   BILITOTAL 0.8   ALKPHOS 91   AST 13   ALT 22     CBC   Recent Labs   Lab 06/30/21  0811   WBC 7.8   RBC 5.10   HGB 14.3   HCT 45.4   MCV 89   MCH 28.0   MCHC 31.5   RDW 13.4            Assessment and plan:     #L ear pain, and bilateral ear fullness -  No signs of otitis externa. No obvious signs of otitis media, although a thorough exam could no be completed due to agitation and aggression of patient. Patient is afebrile and without any leukocytosis. Suspect this is likely due to underlying delusions and psychosis. Please notify medicine if patient were to develop fevers, discharge from ear, worsening pain, and if patient less agitated could re-evaluate at that time vs empiric treatment with abx if suspicion is high.  - No visualization of ear wax build up, no need for debrox   - Minimize patient ear picking   - Monitor for fevers or complaints of teeth/jaw pain  - Please ensure patient has follow-up with PCP or maxillofacial surgeon; they may need to refer him to ENT as an outpatient    Medicine will sign off. No further recommendations at this time. Please page the on-call SINGH for any intercurrent medical issues which arise.     Qing Navarro PA-C  Hospitalist Service  Contact information available via [facility] Paging/Directory

## 2021-07-02 NOTE — PLAN OF CARE
"   Patient requesting prn for anxiety at end of Day shift report.  Pt apologetic re: threatening behavior on day shift and accepted plan to transfer to station 12.   Pt also cooperated with taking AM meds that had been omitted.  Pt still has fixed delusion that there is a bug growing in his ear.   Pt came out for dinner and ate quietly.      Patient approached desk about 1830, requesting something else for anxiety.  Pt given prn and replied \"Maicolu, brother.\"  Pt lying on mattress on floor.  Pt approached nurses' station later in shift, requesting HS meds.  Pt calm and polite.  "

## 2021-07-02 NOTE — TELEPHONE ENCOUNTER
511pm - ANS reports transfer is cancelled   522pm - unit charge on 22 confirms pt has calmed and does not need to transfer to 12 at this time   Pt removed from queue

## 2021-07-02 NOTE — PROGRESS NOTES
Patient was woken up this morning by staff and Internal Medicine so Internal Medicine could check patient's ear. They went to the exam room. Patient became threatening, posturing like he was going to him both of them. He did leave the exam room. See Internal Medicine's note and psych associate's note.  Writer gave patient Valuim 10mg and patient was asleep approximately an hour later. Dr. Rose spoke with patient at 1500 and told him they were putting him on a 72 hour hold and that he would transfer to another unit. The 72 hold rights were given to patient.  Patient became upset stating he wanted to leave.

## 2021-07-03 PROCEDURE — 250N000013 HC RX MED GY IP 250 OP 250 PS 637: Performed by: STUDENT IN AN ORGANIZED HEALTH CARE EDUCATION/TRAINING PROGRAM

## 2021-07-03 PROCEDURE — 124N000002 HC R&B MH UMMC

## 2021-07-03 PROCEDURE — 250N000013 HC RX MED GY IP 250 OP 250 PS 637

## 2021-07-03 RX ADMIN — ACETAMINOPHEN 650 MG: 325 TABLET, FILM COATED ORAL at 20:22

## 2021-07-03 RX ADMIN — CARBAMIDE PEROXIDE 6.5% 2 DROP: 6.5 LIQUID AURICULAR (OTIC) at 19:36

## 2021-07-03 RX ADMIN — HYDROXYZINE HYDROCHLORIDE 25 MG: 25 TABLET, FILM COATED ORAL at 11:51

## 2021-07-03 RX ADMIN — HYDROXYZINE HYDROCHLORIDE 25 MG: 25 TABLET, FILM COATED ORAL at 20:15

## 2021-07-03 RX ADMIN — LORAZEPAM 2 MG: 2 TABLET ORAL at 21:48

## 2021-07-03 RX ADMIN — OLANZAPINE 10 MG: 10 TABLET, ORALLY DISINTEGRATING ORAL at 11:51

## 2021-07-03 RX ADMIN — HYDROXYZINE HYDROCHLORIDE 25 MG: 25 TABLET, FILM COATED ORAL at 17:29

## 2021-07-03 RX ADMIN — LORAZEPAM 2 MG: 2 TABLET ORAL at 12:36

## 2021-07-03 RX ADMIN — HALOPERIDOL 5 MG: 5 TABLET ORAL at 21:48

## 2021-07-03 RX ADMIN — CARBAMIDE PEROXIDE 6.5% 2 DROP: 6.5 LIQUID AURICULAR (OTIC) at 11:54

## 2021-07-03 RX ADMIN — MELATONIN TAB 3 MG 3 MG: 3 TAB at 19:38

## 2021-07-03 RX ADMIN — HALOPERIDOL 5 MG: 5 TABLET ORAL at 12:36

## 2021-07-03 RX ADMIN — DIPHENHYDRAMINE HYDROCHLORIDE 50 MG: 50 CAPSULE ORAL at 12:36

## 2021-07-03 RX ADMIN — OLANZAPINE 10 MG: 10 TABLET, ORALLY DISINTEGRATING ORAL at 20:24

## 2021-07-03 RX ADMIN — DIPHENHYDRAMINE HYDROCHLORIDE 50 MG: 50 CAPSULE ORAL at 20:47

## 2021-07-03 RX ADMIN — ACETAMINOPHEN 650 MG: 325 TABLET, FILM COATED ORAL at 12:35

## 2021-07-03 RX ADMIN — OLANZAPINE 20 MG: 20 TABLET, ORALLY DISINTEGRATING ORAL at 19:00

## 2021-07-03 ASSESSMENT — ACTIVITIES OF DAILY LIVING (ADL)
LAUNDRY: WITH SUPERVISION
DRESS: INDEPENDENT
ORAL_HYGIENE: INDEPENDENT
HYGIENE/GROOMING: INDEPENDENT

## 2021-07-03 NOTE — PLAN OF CARE
Problem: Behavioral Health Plan of Care  Goal: Plan of Care Review  Outcome: Improving  Flowsheets (Taken 7/3/2021 1046)  Plan of Care Reviewed With: patient  Patient Agreement with Plan of Care: (Does not believe he needs to be hospitalized) disagrees (describe)     Problem: Psychotic Symptoms  Goal: Psychotic Symptoms  Description: Signs and symptoms of listed problems will be absent or manageable.  Outcome: Improving  Flowsheets (Taken 7/3/2021 1046)  Psychotic Symptoms Assessed: all  Psychotic Symptoms Present:   anxiety   thought process   mood     Ajaye slept until

## 2021-07-03 NOTE — PLAN OF CARE
Pt appeared to sleep 6.75 hours. No PRNs given or requested. No medical or behavioral events this shift.      Problem: Sleep Disturbance  Goal: Adequate Sleep/Rest  Outcome: No Change

## 2021-07-03 NOTE — PLAN OF CARE
Problem: Behavioral Health Plan of Care  Goal: Plan of Care Review  7/3/2021 1258 by Bri Gonzalez RN  Outcome: No Change  Flowsheets (Taken 7/3/2021 1258)  Plan of Care Reviewed With: patient  Patient Agreement with Plan of Care: (Kali BALTAZAR) disagrees (minor Rey slept until 1130-OOB demanding discharge-insisting RN can orchestrate discharge-RN reminded Candido of incident yesterday and placement of 72 HR hold-informed on call MD cannot discontinue hold-continued to insist MD could reverse decisions of Dr Rose-threatening demanding stance-initially refused PRN, but did accept at 1151 Zyprexa 10 mg PO and hydroxyzine PO 25 mg-Candido slightly calmer, but continued agitated-threatening stance towards psych assoc at desk-telling RN he better not hear his sister's voice again and insinuating staff is making him hear her voice  or is conduit for her voice-difficult to understand reasoning, but freq speaking about Gnosticist and magic and that he is from [place] and knowledgeable about these things while RN is ignorant-threats of aggression if her voice returns-At 1235 accepted Haldol 5 mg PO, Ativan 2 mg PO and Benadryl 50 mg PO-ate lunch, drank Ensure-returned to sleep post eating-Please see note Marshall FOSTER, Psych Assoc

## 2021-07-04 PROCEDURE — 250N000013 HC RX MED GY IP 250 OP 250 PS 637

## 2021-07-04 PROCEDURE — 124N000002 HC R&B MH UMMC

## 2021-07-04 PROCEDURE — 250N000013 HC RX MED GY IP 250 OP 250 PS 637: Performed by: STUDENT IN AN ORGANIZED HEALTH CARE EDUCATION/TRAINING PROGRAM

## 2021-07-04 PROCEDURE — 90853 GROUP PSYCHOTHERAPY: CPT

## 2021-07-04 RX ORDER — BENZTROPINE MESYLATE 0.5 MG/1
0.5 TABLET ORAL 2 TIMES DAILY
Status: DISCONTINUED | OUTPATIENT
Start: 2021-07-04 | End: 2021-07-06 | Stop reason: HOSPADM

## 2021-07-04 RX ORDER — AMOXICILLIN 250 MG
1 CAPSULE ORAL AT BEDTIME
Status: DISCONTINUED | OUTPATIENT
Start: 2021-07-04 | End: 2021-07-06 | Stop reason: HOSPADM

## 2021-07-04 RX ADMIN — CARBAMIDE PEROXIDE 6.5% 2 DROP: 6.5 LIQUID AURICULAR (OTIC) at 22:04

## 2021-07-04 RX ADMIN — HYDROXYZINE HYDROCHLORIDE 25 MG: 25 TABLET, FILM COATED ORAL at 14:36

## 2021-07-04 RX ADMIN — OLANZAPINE 10 MG: 10 TABLET, ORALLY DISINTEGRATING ORAL at 16:36

## 2021-07-04 RX ADMIN — DIPHENHYDRAMINE HYDROCHLORIDE 50 MG: 50 CAPSULE ORAL at 18:17

## 2021-07-04 RX ADMIN — NICOTINE POLACRILEX 4 MG: 4 GUM, CHEWING BUCCAL at 17:25

## 2021-07-04 RX ADMIN — HALOPERIDOL 5 MG: 5 TABLET ORAL at 10:08

## 2021-07-04 RX ADMIN — BENZTROPINE MESYLATE 0.5 MG: 0.5 TABLET ORAL at 23:27

## 2021-07-04 RX ADMIN — CARBAMIDE PEROXIDE 6.5% 2 DROP: 6.5 LIQUID AURICULAR (OTIC) at 10:10

## 2021-07-04 RX ADMIN — DOCUSATE SODIUM AND SENNOSIDES 1 TABLET: 8.6; 5 TABLET ORAL at 18:17

## 2021-07-04 RX ADMIN — ACETAMINOPHEN 650 MG: 325 TABLET, FILM COATED ORAL at 20:31

## 2021-07-04 RX ADMIN — HALOPERIDOL 5 MG: 5 TABLET ORAL at 18:17

## 2021-07-04 RX ADMIN — LORAZEPAM 2 MG: 2 TABLET ORAL at 10:08

## 2021-07-04 RX ADMIN — MELATONIN TAB 3 MG 3 MG: 3 TAB at 21:46

## 2021-07-04 RX ADMIN — DIPHENHYDRAMINE HYDROCHLORIDE 50 MG: 50 CAPSULE ORAL at 10:09

## 2021-07-04 RX ADMIN — ACETAMINOPHEN 650 MG: 325 TABLET, FILM COATED ORAL at 14:10

## 2021-07-04 RX ADMIN — OLANZAPINE 20 MG: 20 TABLET, ORALLY DISINTEGRATING ORAL at 20:32

## 2021-07-04 RX ADMIN — HYDROXYZINE HYDROCHLORIDE 25 MG: 25 TABLET, FILM COATED ORAL at 21:46

## 2021-07-04 RX ADMIN — LORAZEPAM 2 MG: 2 TABLET ORAL at 18:17

## 2021-07-04 ASSESSMENT — ACTIVITIES OF DAILY LIVING (ADL)
ORAL_HYGIENE: INDEPENDENT
HYGIENE/GROOMING: INDEPENDENT
LAUNDRY: WITH SUPERVISION
DRESS: INDEPENDENT

## 2021-07-04 NOTE — PLAN OF CARE
Patient spent most shift between lying on mattress between beds and seeking medication from writer. (Patient said, 'One of those sugar pills.'  when given a prn of Zyprexa.)  Pt kept tissue in ear throughout shift, paranoid that bug is growing in his ear.

## 2021-07-04 NOTE — PLAN OF CARE
Problem: Behavioral Health Plan of Care  Goal: Plan of Care Review  Outcome: No Change  Flowsheets (Taken 7/4/2021 1046)  Plan of Care Reviewed With: patient  Progress: no change  Patient Agreement with Plan of Care: (Demanding discharge) disagrees (describe)     Problem: Psychotic Symptoms  Goal: Psychotic Symptoms  Description: Signs and symptoms of listed problems will be absent or manageable.  Outcome: No Change  Flowsheets (Taken 7/4/2021 1046)  Psychotic Symptoms Assessed: all  Psychotic Symptoms Present:    anxiety    insight    thought process    affect    mood     Candido awake 0950-again demanding discharge-insisting staff can contact on call MD to process discharge-attempted to explain commitment has been filed and someone will come from the county to decide whether he will continue to be hospitalized or not-Candido having difficulty understanding/accepting process-continues to make delusional statements regarding actions and motives of others-requested and received Haldol 5 mg PO, Ativan 2 mg PO and Benadryl 50 mg PO-Code called on other pt-Candido became alert and apologetic post code called for peer- started to confess he initially cheeked one tablet, but then swallowed and took all tablets-insisting young male staff witnessed this and instructing RN to inform all he has taken all meds given-Male staff not aware of above-Candido retreated to his room-1420 Candido calmer post waking-did c/o HA, received Tylenol 650 mg PO approx 1415-At 1436 Candido again asking for discharge-attempting to convince RN to give him his clothes, let him out the door and then report he has runaway-1436 requested and received hydroxyzine 250mg PO for c/o agitation and anxiety

## 2021-07-05 PROCEDURE — 250N000013 HC RX MED GY IP 250 OP 250 PS 637: Performed by: STUDENT IN AN ORGANIZED HEALTH CARE EDUCATION/TRAINING PROGRAM

## 2021-07-05 PROCEDURE — 124N000002 HC R&B MH UMMC

## 2021-07-05 PROCEDURE — U0003 INFECTIOUS AGENT DETECTION BY NUCLEIC ACID (DNA OR RNA); SEVERE ACUTE RESPIRATORY SYNDROME CORONAVIRUS 2 (SARS-COV-2) (CORONAVIRUS DISEASE [COVID-19]), AMPLIFIED PROBE TECHNIQUE, MAKING USE OF HIGH THROUGHPUT TECHNOLOGIES AS DESCRIBED BY CMS-2020-01-R: HCPCS | Performed by: STUDENT IN AN ORGANIZED HEALTH CARE EDUCATION/TRAINING PROGRAM

## 2021-07-05 PROCEDURE — U0005 INFEC AGEN DETEC AMPLI PROBE: HCPCS | Performed by: STUDENT IN AN ORGANIZED HEALTH CARE EDUCATION/TRAINING PROGRAM

## 2021-07-05 PROCEDURE — 250N000013 HC RX MED GY IP 250 OP 250 PS 637

## 2021-07-05 RX ADMIN — DOCUSATE SODIUM AND SENNOSIDES 1 TABLET: 8.6; 5 TABLET ORAL at 18:31

## 2021-07-05 RX ADMIN — HYDROXYZINE HYDROCHLORIDE 25 MG: 25 TABLET, FILM COATED ORAL at 18:31

## 2021-07-05 RX ADMIN — LORAZEPAM 2 MG: 2 TABLET ORAL at 16:24

## 2021-07-05 RX ADMIN — DIPHENHYDRAMINE HYDROCHLORIDE 50 MG: 50 CAPSULE ORAL at 16:24

## 2021-07-05 RX ADMIN — BENZTROPINE MESYLATE 0.5 MG: 0.5 TABLET ORAL at 20:20

## 2021-07-05 RX ADMIN — HYDROXYZINE HYDROCHLORIDE 25 MG: 25 TABLET, FILM COATED ORAL at 13:49

## 2021-07-05 RX ADMIN — OLANZAPINE 10 MG: 10 TABLET, ORALLY DISINTEGRATING ORAL at 12:50

## 2021-07-05 RX ADMIN — NICOTINE POLACRILEX 4 MG: 4 GUM, CHEWING BUCCAL at 20:10

## 2021-07-05 RX ADMIN — OLANZAPINE 20 MG: 20 TABLET, ORALLY DISINTEGRATING ORAL at 20:20

## 2021-07-05 RX ADMIN — HALOPERIDOL 5 MG: 5 TABLET ORAL at 16:24

## 2021-07-05 RX ADMIN — CARBAMIDE PEROXIDE 6.5% 2 DROP: 6.5 LIQUID AURICULAR (OTIC) at 12:41

## 2021-07-05 RX ADMIN — MELATONIN TAB 3 MG 3 MG: 3 TAB at 20:27

## 2021-07-05 RX ADMIN — BENZTROPINE MESYLATE 0.5 MG: 0.5 TABLET ORAL at 12:41

## 2021-07-05 RX ADMIN — CARBAMIDE PEROXIDE 6.5% 2 DROP: 6.5 LIQUID AURICULAR (OTIC) at 20:35

## 2021-07-05 ASSESSMENT — ACTIVITIES OF DAILY LIVING (ADL)
HYGIENE/GROOMING: INDEPENDENT
ORAL_HYGIENE: INDEPENDENT
DRESS: INDEPENDENT
LAUNDRY: WITH SUPERVISION

## 2021-07-05 NOTE — PLAN OF CARE
Patient spent time between lying in room and lounge.   Pt sat out in lounge a couple times this shift, speaking slightly loud and tangential with peers.  Pt

## 2021-07-05 NOTE — PLAN OF CARE
Problem: Psychotic Symptoms  Goal: Psychotic Symptoms  Description: Signs and symptoms of listed problems will be absent or manageable.  Outcome: No Change         Patient spent time between lying in room and lounge.   Pt sat out in lounge a couple times this shift, speaking slightly loud and tangential with peers.  Pt then approached writer and asked for some medication, pointing to his head, then showing his teeth and moving his jaw side-to-side, saying 'see?'.    Pt requested Phil later in shift, reading passages in Malay to writer and appearing tense.  Pt told writer that he had spent a lot of money to be with his father and avenge him.   Pt also wanted to know whom he could talk to regarding being persecuted.   Pt given prn's throughout shift for agitation.  Pt routinely would calm down and was polite with staff.     Showered and combed hair and lying in bed (as opposed to mattress on floor.)

## 2021-07-05 NOTE — PLAN OF CARE
Psychiatry Cross Cover Note    S: Notified by staff that the patient is complaining of bilat muscle cramps and stiffness in LE.     O: /88   Pulse 106   Temp 97.6  F (36.4  C) (Tympanic)   Resp 14   Wt 60.6 kg (133 lb 8 oz)   SpO2 100%   BMI 17.61 kg/m      A/P:   Patient is likely experiencing dystonia secondary to multiple antipsychotics.  - cogentin 0.5 mg BID added to regimen   - primary team to reassess effectiveness / alternatives     _  Cosme Montez,   PGY-2 Psychiatry Resident

## 2021-07-05 NOTE — PLAN OF CARE
Problem: Behavioral Health Plan of Care  Goal: Plan of Care Review  Outcome: Improving  Flowsheets (Taken 7/5/2021 1332)  Plan of Care Reviewed With: patient  Patient Agreement with Plan of Care: (Denial of illness) disagrees (describe)     Problem: Psychotic Symptoms  Goal: Psychotic Symptoms  Description: Signs and symptoms of listed problems will be absent or manageable.  Outcome: Improving  Flowsheets (Taken 7/5/2021 1332)  Psychotic Symptoms Assessed: all  Psychotic Symptoms Present:    thought process    mood    affect    insight    anxiety     Merlinmarke sleeping most of day-OOB 1241 again angry and demanding he be discharged-again explained hold and commitment process, including need to be seen by county examiner -insisting staff is lying and he is not on hold, despite being shown court papers in chart-received Zyprexa 10 mg PO at 1250-ate bkft and returned to room-out of room 1349 c/o anxiety and demanding Valium-explained Valium is not prescribed-accepted hydroxyzine 25 mg PO and returned to room-

## 2021-07-05 NOTE — PLAN OF CARE
"  Problem: Psychotic Symptoms  Goal: Psychotic Symptoms  Description: Signs and symptoms of listed problems will be absent or manageable.  Outcome: No Change   Patient in his room at start of this shift and soon after requesting to be discharged stating his 72hh is over. Staff encouraged patient to speak with his team tomorrow, also informed his 72hh does not include the weekends and holidays. Patient seeking  the supervisor who was on the unit. Became agitated and c/o paranoia, prn Ativan, Benadryl, and Haldol  administered with some relief. Continued to talk to different staff members regarding discharge.  After a short while, patient requested for something to \"calm me down\".  Prn hydroxyzine administered. Ate dinner and is med compliant. Requested for med to help him sleep, received prn Melatonin. No further behaviors.   "

## 2021-07-05 NOTE — PLAN OF CARE
Assessment/Intervention/Current Symtoms and Care Coordination    No team today. Reviewed chart notes.    A petition for commitment was presented to [facility] on July 2nd.    Pt continues to ask staff for discharge. He doesn't appear to understand the court process.         Discharge Plan or Goal  IRTS placement versus residential treatment.        Barriers to Discharge   Pt needs further stabilization      Referral Status  None made      Legal Status  Pt is on a 72 hour hold- Petition for MICD commitment with [facility]

## 2021-07-05 NOTE — PLAN OF CARE
Pt appeared to sleep 7 hours. No PRNs given or requested. No medical or behavioral events this shift.      Problem: Sleep Disturbance  Goal: Adequate Sleep/Rest  Outcome: No Change

## 2021-07-06 VITALS
WEIGHT: 133.5 LBS | RESPIRATION RATE: 14 BRPM | HEART RATE: 106 BPM | OXYGEN SATURATION: 98 % | TEMPERATURE: 97.2 F | BODY MASS INDEX: 17.61 KG/M2 | DIASTOLIC BLOOD PRESSURE: 85 MMHG | SYSTOLIC BLOOD PRESSURE: 124 MMHG

## 2021-07-06 PROCEDURE — 99238 HOSP IP/OBS DSCHRG MGMT 30/<: CPT | Mod: GC | Performed by: PSYCHIATRY & NEUROLOGY

## 2021-07-06 PROCEDURE — 250N000013 HC RX MED GY IP 250 OP 250 PS 637: Performed by: STUDENT IN AN ORGANIZED HEALTH CARE EDUCATION/TRAINING PROGRAM

## 2021-07-06 RX ORDER — OLANZAPINE 10 MG/1
10 TABLET ORAL 2 TIMES DAILY PRN
Qty: 60 TABLET | Refills: 0 | Status: SHIPPED | OUTPATIENT
Start: 2021-07-06 | End: 2021-07-06

## 2021-07-06 RX ORDER — OLANZAPINE 20 MG/1
20 TABLET ORAL AT BEDTIME
Qty: 30 TABLET | Refills: 0 | Status: SHIPPED | OUTPATIENT
Start: 2021-07-06 | End: 2022-10-12

## 2021-07-06 RX ORDER — OLANZAPINE 10 MG/1
10 TABLET ORAL DAILY PRN
Qty: 30 TABLET | Refills: 0 | Status: SHIPPED | OUTPATIENT
Start: 2021-07-06 | End: 2022-10-12

## 2021-07-06 RX ORDER — OLANZAPINE 10 MG/1
20 TABLET ORAL AT BEDTIME
Qty: 60 TABLET | Refills: 0 | Status: SHIPPED | OUTPATIENT
Start: 2021-07-06 | End: 2021-07-06

## 2021-07-06 RX ADMIN — CARBAMIDE PEROXIDE 6.5% 2 DROP: 6.5 LIQUID AURICULAR (OTIC) at 09:51

## 2021-07-06 RX ADMIN — HYDROXYZINE HYDROCHLORIDE 25 MG: 25 TABLET, FILM COATED ORAL at 04:11

## 2021-07-06 RX ADMIN — BENZTROPINE MESYLATE 0.5 MG: 0.5 TABLET ORAL at 11:17

## 2021-07-06 NOTE — PLAN OF CARE
Assessment/Intervention/Current Symtoms and Care Coordination  -Refer to psychosocial completed by Fifi Lainez [facility] on 6/30/2021 for assessment/social functioning  -Chart review  -Pre round meeting with team  -Rounded with team, addressed patient needs/concerns  -Post round meeting with team  -Team note  -Update provided to Pre petition screener Tremaine Townsend. He plans to come to the unit today at 3:30pm to interview patient.    UPDATE: Provider has determined patient is stable enough to drop petition. Patient will be discharged with phone numbers to a PCP and Rule 25 resources.He did not follow through with appointments made at last admission.    -Writer contacted pre petition screener, he will alert the , no further action is required on our end.    Current Symptoms include the following: AH, patient is irritable, aggressive, threatening and verbally intimidating    Discharge Plan or Goal  Pending stabilization & development of a safe discharge plan.  Considerations include: Home    Barriers to Discharge  Patient requires further psychiatric stabilization due to current symptomology    Referral Status  None    Legal Status

## 2021-07-06 NOTE — PLAN OF CARE
Problem: Adult Inpatient Plan of Care  Goal: Optimal Comfort and Wellbeing  Outcome: No Change     Problem: Sleep Disturbance  Goal: Adequate Sleep/Rest  Outcome: No Change   Observed to have slept well for approx 5.5 hrs overnight w/ wakefulness mid shift for snack  and then early morning wakefulness w/ noted restlessness but no requests or observed distress.  Safe, therapeutic environment maintained.

## 2021-07-06 NOTE — PLAN OF CARE
"Pt presents as paranoid, staff was taking a sitting and standing blood pressure and pt approached writer asking if staff was \"playing with his head by taking two different blood pressures.\" Pt refused 0800 miralax and had initially refused cogentin but later approached writer to take cogentin. He is irritable throughout the shift and tends to be fixated on being able to discharge. Pt took a shower this morning, sleep and appetite appear to be good. Pt currently denies all mental health symptoms. He will discharge to family via bus.  "

## 2021-07-06 NOTE — DISCHARGE INSTRUCTIONS
"Behavioral Discharge Planning and Instructions    Summary: You were admitted on 6/29/2021 due to psychotic symptomology due to drug use. You were treated by Dr. Rose and discharged on 7/6/21 from [facility] to home.  It is strongly encouraged that you complete a chemical dependency assessment and follow recommendations. Referrals are listed below.     Main Diagnosis:   Schizoaffective Disorder    Health Care Follow-up:   Medication Management -   Primary Provider Referral: Pt to schedule appt 30 days within discharge  [facility]  [address] [place], [place] [address]e. [address]  [facility] [address]  [phone]    Sober Support Group Information: AA/NA & Sponsor/Support  Alcoholics Anonymous (www.alcoholics-anonymous.org): for local information 24 hours/day  AA Intergroup service office in [place] (http://www.aastpaul.org/) 457.832.6717  AA Intergroup service office in [facility]: [phone]. (http://www.aaminneaCartera Commerceis.org/)  Narcotics Anonymous (www.[employer].org) (642) 123-6496  Sober Fun Activities: www.sober-activities.[employer]/[facility]//[facility] Recovery Connection ([facility])  [facility] connects people seeking recovery to resources that help foster and sustain long-term recovery. Whether you are seeking resources for treatment, transportation, housing, job training, education, health care or other pathways to recovery, [facility] is a great place to start.  Phone: 185.999.8345. www.[place]Yapta.[place] (Great listing of all types of recovery and non-recovery related resources)\"    Rule 25:   [facility] Mental Health and Addiction Services Line: 1-132.897.7931  [place]: (934) 551-7088  [address]: (772) 520-3868  [facility] Intake: [phone]    Attend all scheduled appointments with your outpatient providers. Call at least 24 hours in advance if you need to reschedule an appointment to ensure continued access to your outpatient providers.     Major Treatments, Procedures " and Findings:  You were provided with: a psychiatric assessment, assessed for medical stability, medication evaluation and/or management, group therapy, milieu management.    Symptoms to Report: Feeling more aggressive, increased confusion, losing more sleep, mood getting worse, or thoughts of suicide.    Early warning signs can include: Increased depression or anxiety sleep disturbances increased thoughts or behaviors of suicide or self-harm  increased unusual thinking, such as paranoia or hearing voices.    Safety and Wellness: Take all medicines as directed. Make no changes unless your doctor suggests them. Follow treatment recommendations. Refrain from alcohol and non-prescribed drugs.  Ask your support system to help you reduce your access to items that could harm yourself or others. If there is a concern for safety, call 911.    Resources:   Crisis Intervention: 674.711.9582 or 990-758-8681 (TTY: 520.927.8943).  Call anytime for help.  National [place] on Mental Illness (www.mn.alfredo.org): 331.340.2819 or 867-178-7015.  Alcoholics Anonymous (www.alcoholics-anonymous.org): Check your phone book for your local chapter.  Suicide Awareness Voices of Education (SAVE) (www.save.org): 548-253-TSJB (1620)  National Suicide Prevention Line (www.mentalhealthmn.org): 886-840-AESU (9209)  Mental Health Consumer/Survivor Network of [place] (www.mhcsn.net): 622.912.3741 or 770-312-1342  [facility] Crisis Response - Adult 723 361-0710    General Medication Instructions:     See your medication sheet(s) for instructions.     Take all medicines as directed.  Make no changes unless your doctor suggests them.     Go to all your doctor visits.    Be sure to have all your required lab tests. This way, your medicines can be refilled on time.    Do not use any drugs not prescribed by your doctor.    Avoid alcohol.    Advance Directives:   Scanned document on file with [facility]? No scanned doc  Is document scanned? No. Copy  Requested.  Honoring Choices Your Rights Handout: Informed and given  Was more information offered? Pt declined    The Treatment team has appreciated the opportunity to work with you. If you have any questions or concerns about your recent admission, you can contact the unit which can receive your call 24 hours a day, 7 days a week. They will be able to get in touch with a Provider if needed. The unit number is [phone] .

## 2021-07-06 NOTE — PLAN OF CARE
BEHAVIORAL TEAM DISCUSSION    Participants:   Dr. Camilo Rose, Attending Psychiatrist  Dr. Mazin Garcia, PGY1  Jane Leon, LPCC, LADC, CTC  Ana Cornell, OT  Betty Marr, TAD  Progress: Initial  Anticipated length of stay: Unknown, pending stabilization and appropriate disposition plan.   Continued Stay Criteria/Rationale: Initiated civil commitment process with [facility]  Medical/Physical: No acute issues - was cleared by ED for psychiatric admission  Precautions:   Behavioral Orders   Procedures     Assault precautions     Code 1 - Restrict to Unit     Discontinue 1:1 attendant for suicide risk     Order Specific Question:   I have performed an in person assessment of the patient     Answer:   Based on this assessment the patient no longer requires a one on one attendant at this point in time.     Order Specific Question:   Rationale     Answer:   Patient States able to remain safe in hospital     Elopement precautions     Routine Programming     As clinically indicated     Status 15     Every 15 minutes.     Withdrawal precautions     Plan: MICD petition with [facility] initiated.  Rationale for change in precautions or plan: No change at present-will continue to monitor and adjust as needed.

## 2021-07-06 NOTE — PROGRESS NOTES
SPIRITUAL HEALTH SERVICES  SPIRITUAL ASSESSMENT Progress Note  [facility] ([facility]) UR 22NB     REFERRAL SOURCE: self initiated visit    Pt came and talk to me when I finished my visit with my previous  pt. Pt mentioned that it was his on will to admit himself to the hospital. Pt stated that [place] he was born to a [place] parents [place] brought up in [place] speaks fluent in Thai and [place]. He shared that he has strong islamic damion that he hasn't  been following well. Pt mentioned he lost his father and his mom and his  sister left the country because  him and his 2 sisters became an addict. He became very emotional and felt very lonely in this world and has a feeling his mom passed but no one is sharing. I prayed that may Allah kym him the strength to calm his soul and focus on what he wants.    PLAN: [facility] will always provide support and follow up upon request.    Maryse Winslow Resident  Phone: [phone]

## 2021-07-07 NOTE — DISCHARGE SUMMARY
"    Psychiatric Discharge Summary    Hospital Day #7    Candido Sahni MRN# [id number]   Age: 31 year old YOB: 1990     Date of Admission:  6/29/2021  Date of Discharge:  7/6/2021  2:15 PM  Admitting Physician:  Camilo Rose MD  Discharge Physician:                 Camilo Rose MD         Event Leading to Hospitalization:   Candido Sahni is a 31 year old male previously diagnosed with substance-induced psychosis and with a history of polysubstance use (K2, amphetamines) admitted from the [facility] ED on a 72HH on 6/29/21 due to concern for CAH telling him to harm himself in the context  of possible substance use and a recent discharge from [facility] (6/15/21).    Per ED note: \"Candido Sahni is a 31 year old male who resents for mental health evaluation.  The circumstances are not entirely clear to me as to what happened tonight.  He is very vague at this point stating, \" the world is small to me.\" \" I was hot and then I was cold.\"  He then tells me that he needs to gather his thoughts does not want to talk to me right now.  He denies any injuries tonight.  He will not answer me when I asked him about substance use.  He denies any acute physical complaints this time and states that his body feels healthy.\"     He was medically cleared for admission to inpatient psychiatric unit.       See Admission note by Camilo King MD on 06/29 for additional details.          Diagnoses:   #Primary Psychiatric Diagnosis  # substance-induced psychosis  # Schizoaffective disorder  #Secondary Psychiatric Diagnosis      Diagnostic Impression:   Candido Sahni is a 31 year old male previously diagnosed with substance-induced psychosis and with a history of polysubstance use (K2, amphetamines) admitted from the [facility] ED on a 72HH on 6/29/21 due to concern for CAH telling him to harm himself in the context  of possible substance use and a recent discharge from [facility] (6/15/21). He has had multiple inpatient admissions " with a similar presentation. During his last admission he was advised to start neuroleptic medications however he declined. Significant symptoms include irritable, psychosis and disorganization.  The MSE on admission is notable for an irritable patient who denied auditory hallucinations but was otherwise uncooperative with the interview.  His symptoms are consistent with his historic diagnosis of substance-induced psychosis.  It is unclear if substance use is contributing to his picture, he denied any substance use on admission however he also declined a UDS. Given his history of substance-induced psychosis this remains the most likely diagnosis.      Prediposing factors for the patient's presentation are unknown. Perpetuating factors include housing instability, substance use, and maladaptive coping skills. Precipitating factors for the patient's presentation are unknown. Patient has protective factors in the form of resiliency.          Consults:   Internal medicine Consult: #L ear pain, and bilateral ear fullness -  No signs of otitis externa. No obvious signs of otitis media, although a thorough exam could no be completed due to agitation and aggression of patient. Patient is afebrile and without any leukocytosis. Suspect this is likely due to underlying delusions and psychosis. Please notify medicine if patient were to develop fevers, discharge from ear, worsening pain, and if patient less agitated could re-evaluate at that time vs empiric treatment with abx if suspicion is high.  - No visualization of ear wax build up, no need for debrox   - Minimize patient ear picking   - Monitor for fevers or complaints of teeth/jaw pain  - Please ensure patient has follow-up with PCP or maxillofacial surgeon; they may need to refer him to ENT as an outpatient     Medicine will sign off. No further recommendations at this time.    The patient became suddenly threatening toward the examiner when she contradicated his  "DELUSIONAL assertion that worms were in his ears. IT is recommended that in future medical evaluations the results be communicated directly to the psychiatry team.           Hospital Course:   Psychiatric Course:  Patient placed on 72 hours hold on 06/29. First day of hospitalization seems to minimally improved on previous medications therefore it was decided to increase his Olanzapine dose and make olanzapine   PRN available as well. Patient agreed with medication changes.  MSSA and COWS protocol were discontinued because it was considered no longer necessary due to low risk of withdrawal.     Candido Sahni was admitted to [facility] as a voluntary patient/ on a 72 hour hold. Compliant with scheduled Olanzapine 20mg PO. Agreed to voluntary admission on 7/01. Patient became agitated and verbally aggressive toward staff, posturing and lunged toward staff with fists in the air. Pt was put on new 72hr hold on 7/02 and filing for commitment started, however, Over the weekend staff reported he was more cooperative and more determined to work on improving health on the outpatient setting. CD was offered. Decision to discharge was made.    Medical Course:  Patient remained afebrile and complaint of ear fullness resolved.    Risk Assessment:      Today Candido Sahni denies SI, SIB and HI. No overt evidence of psychosis or cory observed. Patient grossly appears to be cognitively intact. Insight and judgement have improved since admission, especially over the weekend..  Patient is aware of consequences of stopping medications and not following outpatient recommendations, including CD treatment. Patient expresses understanding of CD and mental illness associated with it, also expressess  willingness to seek help when needed. Patient has exhibited aggressive or violence behaviors, however, he acknowledges that he has to \"work on it\" and that he \"want to no harm nobody\".     He has notable risk factors for self-harm, " "including single status, anxiety and substance abuse. However, risk is mitigated by Restorationist beliefs, absence of past attempts, ability to volunteer a safety plan and history of seeking help when needed. Patient does/does not have immediate access to firearms. Therefore, based on all available evidence including the factors cited above, he does not appear to be at imminent risk for self-harm, does not meet criteria for commitment petition at this time, and therefore remains appropriate for ongoing outpatient level of care. Patient CD treament was strongly recommended he mentioned \"I'll think about it and talk with my auntie\". Patient agreed to further reduce risk of self-harm by  agreed to remain medication adherent. Additional steps taken to minimize risk include: medication optimization, close psychiatric follow up and provision of crisis resources. Voluntary referral for day treatment was offered, he declined this offer. Patient expressed understanding of risk associated with his chemical dependency and substance-induced psychosis including increased risk of harm to self or others.     During this admission Candido improved clinically, and his symptoms of CAH resolved. At the time of discharge he was determined to not be a danger to himself or others.     This document serves as a transfer of care to Candido Sahni's outpatient providers.         Discharge Medications:     Discharge Medication List as of 7/6/2021  1:26 PM      START taking these medications    Details   !! OLANZapine (ZYPREXA) 10 MG tablet Take 1 tablet (10 mg) by mouth daily as needed (for increased aggitation/anxiety), Disp-30 tablet, R-0, E-PrescribeDo not exceed total dose of 40 mg in 24 hours.      !! OLANZapine (ZYPREXA) 20 MG tablet Take 1 tablet (20 mg) by mouth At Bedtime, Disp-30 tablet, R-0, E-Prescribe       !! - Potential duplicate medications found. Please discuss with provider.               Psychiatric Examination: " "  Appearance:  no apparent distress, normal posture, normal gait, thin and appears stated age  Attitude:  cooperative and engaged  Psychomotor:  no evidence of tics, dystonia, or tardive dyskinesia  Eye Contact: appropriate  Speech:  fluent English, normal tone and talkative  Mood: \"I'm good\"  Affect:  congruent and appropriate  Thought Content: No SI, CAH  Thought Process: coherent and goal directed  Sensorium: awake and alert  Cognition: memory grossly intact  Impulse control: fair  Insight: questionable  Judgment: questionable         Discharge Plan:      Discharge home (aunt or friend)    Other referrals/resources:  Health Care Follow-up:   Medication Management -   Primary Provider Referral: Pt to schedule appt 30 days within discharge  [facility]  [address]  [place] [facility] [address]  [phone]     Sober Support Group Information: AA/NA & Sponsor/Support  Alcoholics Anonymous (www.alcoholics-anonymous.org): for local information 24 hours/day  AA Intergroup service office in [place] (http://www.[employer].org/) [phone]  AA Intergroup service office in [facility]: [phone]. (http://www.[employer].org/)  Narcotics Anonymous (www.[place].org) (210) 152-9731  Sober Fun Activities: www.sober-activities.[employer]/cities//[facility] ([facility])  [facility] connects people seeking recovery to resources that help foster and sustain long-term recovery. Whether you are seeking resources for treatment, transportation, housing, job training, education, health care or other pathways to recovery, [facility] is a great place to start.  Phone: [phone]. www.[employer]reaon (Great listing of all types of recovery and non-recovery related resources)\"     Rule 25:   [facility] Services Line: 1-404.651.4946  [place]: ([phone] 342-5711  [address]: ([phone]  [facility] Intake: " [phone]     Attend all scheduled appointments with your outpatient providers. Call at least 24 hours in advance if you need to reschedule an appointment to ensure continued access to your outpatient providers.       Pt seen and discussed with my attending physician, Camilo Rose MD.   Mazin Garcia MD  PGY-1 Psychiatry Resident    Attestation:   The patient has been seen and evaluated by me,  Camilo Rose MD. I have examined the patient today and reviewed the discharge plan with the resident and medical student. I agree with the final assessment and plan, as noted in the discharge summary. I have reviewed today's vital signs, medications, labs and imaging.  Total time discharge plannin minutes  Camilo Rose MD ,Ph.D.            Appendix A: All Labs This Admission:     Results for orders placed or performed during the hospital encounter of 21   Asymptomatic SARS-CoV-2 COVID-19 Virus (Coronavirus) by PCR     Status: None    Specimen: Nasopharyngeal   Result Value Ref Range    SARS-CoV-2 Virus Specimen Source Nasopharyngeal     SARS-CoV-2 PCR Result NEGATIVE     SARS-CoV-2 PCR Comment (Note)    TSH with free T4 reflex and/or T3 as indicated     Status: None   Result Value Ref Range    TSH 0.73 0.40 - 4.00 mU/L   CBC with platelets differential     Status: None   Result Value Ref Range    WBC 7.8 4.0 - 11.0 10e9/L    RBC Count 5.10 4.4 - 5.9 10e12/L    Hemoglobin 14.3 13.3 - 17.7 g/dL    Hematocrit 45.4 40.0 - 53.0 %    MCV 89 78 - 100 fl    MCH 28.0 26.5 - 33.0 pg    MCHC 31.5 31.5 - 36.5 g/dL    RDW 13.4 10.0 - 15.0 %    Platelet Count 352 150 - 450 10e9/L    Diff Method Automated Method     % Neutrophils 53.3 %    % Lymphocytes 36.0 %    % Monocytes 6.5 %    % Eosinophils 3.4 %    % Basophils 0.5 %    % Immature Granulocytes 0.3 %    Nucleated RBCs 0 0 /100    Absolute Neutrophil 4.1 1.6 - 8.3 10e9/L    Absolute Lymphocytes 2.8 0.8 - 5.3 10e9/L    Absolute Monocytes 0.5 0.0 - 1.3 10e9/L    Absolute  Eosinophils 0.3 0.0 - 0.7 10e9/L    Absolute Basophils 0.0 0.0 - 0.2 10e9/L    Abs Immature Granulocytes 0.0 0 - 0.4 10e9/L    Absolute Nucleated RBC 0.0    Comprehensive metabolic panel     Status: Abnormal   Result Value Ref Range    Sodium 138 133 - 144 mmol/L    Potassium 4.1 3.4 - 5.3 mmol/L    Chloride 108 94 - 109 mmol/L    Carbon Dioxide 25 20 - 32 mmol/L    Anion Gap 5 3 - 14 mmol/L    Glucose 152 (H) 70 - 99 mg/dL    Urea Nitrogen 14 7 - 30 mg/dL    Creatinine 0.69 0.66 - 1.25 mg/dL    GFR Estimate >90 >60 mL/min/[1.73_m2]    GFR Estimate If Black >90 >60 mL/min/[1.73_m2]    Calcium 8.6 8.5 - 10.1 mg/dL    Bilirubin Total 0.8 0.2 - 1.3 mg/dL    Albumin 3.3 (L) 3.4 - 5.0 g/dL    Protein Total 6.8 6.8 - 8.8 g/dL    Alkaline Phosphatase 91 40 - 150 U/L    ALT 22 0 - 70 U/L    AST 13 0 - 45 U/L   Asymptomatic SARS-CoV-2 COVID-19 Virus (Coronavirus) by PCR     Status: None    Specimen: Nasopharyngeal   Result Value Ref Range    SARS-CoV-2 Virus Specimen Source Nasopharyngeal     SARS-CoV-2 PCR Result NEGATIVE     SARS-CoV-2 PCR Comment       Testing was performed using the Xpert Xpress SARS-CoV-2 Assay on the Cepheid Gene-Xpert   Instrument Systems. Additional information about this Emergency Use Authorization (EUA)   assay can be found via the Lab Guide.     EKG 12-lead, tracing only     Status: None   Result Value Ref Range    Interpretation ECG Click View Image link to view waveform and result    Internal Medicine Adult IP Consult for [facility] in [facility]: Patient to be seen: Routine within 24 hrs; Call back #: [phone]; ear fullness; concern for acute otitis media or otitis externa; Consultant may enter orders: Yes; Requestin...     Status: None ()    Narrative    Qing Navarro PA-C     7/2/2021 11:52 AM  Brief Internal Medicine Note     Subjective:   Candido Sahni is a 31 year old male with PMHx of psychosis   and polysubstance abuse who admitted to inpatient psychiatry on   72 hour  sam. Medicine consulted for concern of otitis   externa/interna.     Per chart review, this has been an ongoing issue. Patient was   seen in [place] ER on 05/15/2021 for left ear pain as well.   Appears patient complains of ear pain during frequent ED visits.   Felt at that time to represent otitis externa and was given   course of penicillin as well due to concern for decaying wisdom   tooth and was recommended to follow-up with his maxillofacial   surgeon given ongoing issues with left ear/jaw. Unclear if   patient completed course of abx or used drops given paranoia and   substance use. Patient was then prescribed Ofloxacin 0.4% drops   on 6/16 for otitis externa.     Today patient reports feeling L>R ear pain, and feels that there   is a bug in his ear. Denies any changes in his hearing.  Reports   clicking in L jaw with opening mouth. Attempted to visual ears,   exam below. After telling patient there were no bugs or anything   in his ears, he became upset and agitated. Patient then made   threatening movement towards my self and lunged at staff with   fists in the air. Patient was re-directed to his room.     Objective:  /80   Pulse 76   Temp 97.7  F (36.5  C) (Tympanic)     Resp 14   Wt 60.6 kg (133 lb 8 oz)   SpO2 98%   BMI 17.61   kg/m    General: Alert, interactive, NAD.   HEENT: AT/NC. Anicteric sclera.   Ears: External ears appears normal.  L ear with normal external   canal. L TM intact with +light reflex without any erythema or   injection.  ?possible effusion and with attempt to re-visualize,   patient became agitated and was unable to complete exam. R ear   with normal canal. R TM intact with +light refex, and no   erythema.   Resp: non-labored breathing on RA.   Neuro: Moving extremities symmetrically. Steady gait.     Labs/diagnostics:   ROUTINE IP LABS (Last four results)  CMP   Recent Labs   Lab 06/30/21  0811      POTASSIUM 4.1   CHLORIDE 108   CO2 25   ANIONGAP 5   *    BUN 14   CR 0.69   PATIENCE 8.6   PROTTOTAL 6.8   ALBUMIN 3.3*   BILITOTAL 0.8   ALKPHOS 91   AST 13   ALT 22     CBC   Recent Labs   Lab 06/30/21  0811   WBC 7.8   RBC 5.10   HGB 14.3   HCT 45.4   MCV 89   MCH 28.0   MCHC 31.5   RDW 13.4            Assessment and plan:     #L ear pain, and bilateral ear fullness -  No signs of otitis   externa. No obvious signs of otitis media, although a thorough   exam could no be completed due to agitation and aggression of   patient. Patient is afebrile and without any leukocytosis.   Suspect this is likely due to underlying delusions and psychosis.   Please notify medicine if patient were to develop fevers,   discharge from ear, worsening pain, and if patient less agitated   could re-evaluate at that time vs empiric treatment with abx if   suspicion is high.  - No visualization of ear wax build up, no need for debrox   - Minimize patient ear picking   - Monitor for fevers or complaints of teeth/jaw pain  - Please ensure patient has follow-up with PCP or maxillofacial   surgeon; they may need to refer him to ENT as an outpatient    Medicine will sign off. No further recommendations at this time.   Please page the on-call [facility] for any intercurrent medical issues   which arise.     Qing Navarro PA-C  Hospitalist Service  Contact information available via [facility] Paging/Directory

## 2022-10-01 ENCOUNTER — HOSPITAL ENCOUNTER (EMERGENCY)
Facility: CLINIC | Age: 32
Discharge: HOME | End: 2022-10-01
Payer: COMMERCIAL

## 2022-10-01 VITALS
OXYGEN SATURATION: 95 % | WEIGHT: 130 LBS | HEIGHT: 73 IN | SYSTOLIC BLOOD PRESSURE: 132 MMHG | RESPIRATION RATE: 18 BRPM | HEART RATE: 96 BPM | DIASTOLIC BLOOD PRESSURE: 85 MMHG | TEMPERATURE: 97.2 F | BODY MASS INDEX: 17.23 KG/M2

## 2022-10-01 ASSESSMENT — ACTIVITIES OF DAILY LIVING (ADL): ADLS_ACUITY_SCORE: 35

## 2022-10-01 NOTE — ED TRIAGE NOTES
"Pt called EMS from a shelter today because of some abdominal pain.  Per EMS pt was wondering if they had a pill \"for drug cravings.\"  When EMS stated that they did not have such pills; pt asked for narcotics.  Pt states that the pain is directly above his belly button.        "

## 2022-10-10 ENCOUNTER — OFFICE VISIT (OUTPATIENT)
Dept: BEHAVIORAL HEALTH | Facility: CLINIC | Age: 32
End: 2022-10-10
Payer: COMMERCIAL

## 2022-10-10 VITALS
HEIGHT: 73 IN | HEART RATE: 120 BPM | BODY MASS INDEX: 16.44 KG/M2 | DIASTOLIC BLOOD PRESSURE: 70 MMHG | WEIGHT: 124 LBS | SYSTOLIC BLOOD PRESSURE: 111 MMHG

## 2022-10-10 DIAGNOSIS — F11.93 OPIOID WITHDRAWAL (H): ICD-10-CM

## 2022-10-10 DIAGNOSIS — F17.200 TOBACCO USE DISORDER: ICD-10-CM

## 2022-10-10 DIAGNOSIS — F11.20 OPIOID USE DISORDER, SEVERE, DEPENDENCE (H): Primary | ICD-10-CM

## 2022-10-10 DIAGNOSIS — F15.20 METHAMPHETAMINE USE DISORDER, SEVERE (H): ICD-10-CM

## 2022-10-10 DIAGNOSIS — F29 PSYCHOSIS, UNSPECIFIED PSYCHOSIS TYPE (H): ICD-10-CM

## 2022-10-10 LAB — FENTANYL UR QL: ABNORMAL

## 2022-10-10 PROCEDURE — 99215 OFFICE O/P EST HI 40 MIN: CPT | Performed by: FAMILY MEDICINE

## 2022-10-10 PROCEDURE — 80307 DRUG TEST PRSMV CHEM ANLYZR: CPT | Performed by: FAMILY MEDICINE

## 2022-10-10 RX ORDER — GABAPENTIN 300 MG/1
300-600 CAPSULE ORAL 3 TIMES DAILY PRN
Qty: 30 CAPSULE | Refills: 0 | Status: SHIPPED | OUTPATIENT
Start: 2022-10-10

## 2022-10-10 RX ORDER — CLONIDINE HYDROCHLORIDE 0.1 MG/1
0.1 TABLET ORAL 3 TIMES DAILY PRN
Qty: 15 TABLET | Refills: 0 | Status: SHIPPED | OUTPATIENT
Start: 2022-10-10

## 2022-10-10 RX ORDER — HYDROXYZINE PAMOATE 50 MG/1
50-100 CAPSULE ORAL 3 TIMES DAILY PRN
Qty: 30 CAPSULE | Refills: 0 | Status: SHIPPED | OUTPATIENT
Start: 2022-10-10

## 2022-10-10 RX ORDER — BUPRENORPHINE AND NALOXONE 12; 3 MG/1; MG/1
1 FILM, SOLUBLE BUCCAL; SUBLINGUAL 2 TIMES DAILY
Qty: 20 FILM | Refills: 0 | Status: SHIPPED | OUTPATIENT
Start: 2022-10-10

## 2022-10-10 ASSESSMENT — PATIENT HEALTH QUESTIONNAIRE - PHQ9: SUM OF ALL RESPONSES TO PHQ QUESTIONS 1-9: 7

## 2022-10-12 ENCOUNTER — TELEPHONE (OUTPATIENT)
Dept: BEHAVIORAL HEALTH | Facility: CLINIC | Age: 32
End: 2022-10-12

## 2022-10-12 NOTE — TELEPHONE ENCOUNTER
This writer called the patient to discuss his mental health and needs for additional mental health services and the number on file a man answered and stated that the patient was not available and he also stated that the person who's phone the patient stated owns this number was also not avalible.

## 2022-10-19 ENCOUNTER — TELEPHONE (OUTPATIENT)
Dept: BEHAVIORAL HEALTH | Facility: CLINIC | Age: 32
End: 2022-10-19
Payer: COMMERCIAL

## 2022-10-19 DIAGNOSIS — F11.20 OPIOID USE DISORDER, SEVERE, DEPENDENCE (H): Primary | ICD-10-CM

## 2022-10-19 NOTE — TELEPHONE ENCOUNTER
PRC placed call to pt given no show for recent  [facility] appointment.   PRC unable to leave a voicemail as message states  the phone voicemail is not set up.     Sivakumar Loomis  Peer   [facility]  [phone]